# Patient Record
Sex: MALE | Race: WHITE | Employment: STUDENT | URBAN - METROPOLITAN AREA
[De-identification: names, ages, dates, MRNs, and addresses within clinical notes are randomized per-mention and may not be internally consistent; named-entity substitution may affect disease eponyms.]

---

## 2018-03-21 ENCOUNTER — OFFICE VISIT (OUTPATIENT)
Dept: FAMILY MEDICINE CLINIC | Age: 23
End: 2018-03-21
Payer: COMMERCIAL

## 2018-03-21 VITALS
SYSTOLIC BLOOD PRESSURE: 92 MMHG | TEMPERATURE: 98.3 F | HEART RATE: 88 BPM | RESPIRATION RATE: 16 BRPM | HEIGHT: 65 IN | OXYGEN SATURATION: 99 % | DIASTOLIC BLOOD PRESSURE: 68 MMHG | WEIGHT: 141.6 LBS | BODY MASS INDEX: 23.59 KG/M2

## 2018-03-21 DIAGNOSIS — L40.9 PSORIASIS: Primary | ICD-10-CM

## 2018-03-21 DIAGNOSIS — R19.00 ABDOMINAL WALL MASS OF LEFT FLANK: ICD-10-CM

## 2018-03-21 DIAGNOSIS — R01.1 CHANGE IN HEART MURMUR: ICD-10-CM

## 2018-03-21 PROCEDURE — G8427 DOCREV CUR MEDS BY ELIG CLIN: HCPCS | Performed by: FAMILY MEDICINE

## 2018-03-21 PROCEDURE — 1036F TOBACCO NON-USER: CPT | Performed by: FAMILY MEDICINE

## 2018-03-21 PROCEDURE — 99202 OFFICE O/P NEW SF 15 MIN: CPT | Performed by: FAMILY MEDICINE

## 2018-03-21 PROCEDURE — G8420 CALC BMI NORM PARAMETERS: HCPCS | Performed by: FAMILY MEDICINE

## 2018-03-21 PROCEDURE — G8484 FLU IMMUNIZE NO ADMIN: HCPCS | Performed by: FAMILY MEDICINE

## 2018-03-21 RX ORDER — FLUOCINONIDE 0.5 MG/G
OINTMENT TOPICAL
Qty: 1 TUBE | Refills: 0 | Status: SHIPPED | OUTPATIENT
Start: 2018-03-21 | End: 2018-03-28

## 2018-03-21 ASSESSMENT — ENCOUNTER SYMPTOMS
ABDOMINAL PAIN: 0
COLOR CHANGE: 1

## 2018-03-21 ASSESSMENT — PATIENT HEALTH QUESTIONNAIRE - PHQ9
1. LITTLE INTEREST OR PLEASURE IN DOING THINGS: 0
SUM OF ALL RESPONSES TO PHQ QUESTIONS 1-9: 0
2. FEELING DOWN, DEPRESSED OR HOPELESS: 0
SUM OF ALL RESPONSES TO PHQ9 QUESTIONS 1 & 2: 0

## 2018-03-21 NOTE — PROGRESS NOTES
98.3 °F (36.8 °C) (Tympanic)   Resp 16   Ht 5' 5\" (1.651 m)   Wt 141 lb 9.6 oz (64.2 kg)   SpO2 99%   BMI 23.56 kg/m²     Physical Exam   Musculoskeletal:        Arms:  Quarter sized soft tissue mass which is not fluctuant or erythematous. Not adherent to ribs    Skin:        Faint erythema with silvery scale and well bordered lesions. No warmth or swelling of knee    Neck:no carotid bruits. No masses. No adenopathy. No thyroid asymmetry. Lungs:clear and equal breath sounds. No wheezes or rales. Heart:rate reg. 1-2/6  syst murmur  along rub. No gallops     Extremities:no edema in either leg  Gen: In no acute distress      Assessment:         1. Psoriasis     2. Change in heart murmur  ECHO Complete 2D W Doppler W Color   3.  Abdominal wall mass of left flank        Plan:    observation of suspected lipoma as has been stable for over 5 years   Orders Placed This Encounter   Procedures    ECHO Complete 2D W Doppler W Color     Standing Status:   Future     Standing Expiration Date:   3/21/2019     Order Specific Question:   Reason for exam:     Answer:   9     Orders Placed This Encounter   Medications    fluocinonide (LIDEX) 0.05 % ointment     Sig: Apply topically 2 times daily x 2 weeks     Dispense:  1 Tube     Refill:  0   f/u after above done

## 2023-04-10 ENCOUNTER — PROCEDURE VISIT (OUTPATIENT)
Dept: PAIN MANAGEMENT | Age: 28
End: 2023-04-10
Payer: COMMERCIAL

## 2023-04-10 DIAGNOSIS — M79.602 BILATERAL ARM PAIN: Primary | ICD-10-CM

## 2023-04-10 DIAGNOSIS — M79.601 BILATERAL ARM PAIN: Primary | ICD-10-CM

## 2023-04-10 PROCEDURE — 99999 PR OFFICE/OUTPT VISIT,PROCEDURE ONLY: CPT | Performed by: PHYSICAL MEDICINE & REHABILITATION

## 2023-04-10 PROCEDURE — 95886 MUSC TEST DONE W/N TEST COMP: CPT | Performed by: PHYSICAL MEDICINE & REHABILITATION

## 2023-04-10 PROCEDURE — 95911 NRV CNDJ TEST 9-10 STUDIES: CPT | Performed by: PHYSICAL MEDICINE & REHABILITATION

## 2023-10-09 ENCOUNTER — OFFICE VISIT (OUTPATIENT)
Dept: FAMILY MEDICINE CLINIC | Age: 28
End: 2023-10-09
Payer: COMMERCIAL

## 2023-10-09 VITALS
SYSTOLIC BLOOD PRESSURE: 110 MMHG | HEIGHT: 65 IN | WEIGHT: 162.4 LBS | TEMPERATURE: 98.4 F | DIASTOLIC BLOOD PRESSURE: 70 MMHG | BODY MASS INDEX: 27.06 KG/M2 | OXYGEN SATURATION: 98 % | HEART RATE: 70 BPM

## 2023-10-09 DIAGNOSIS — H66.92 LEFT ACUTE OTITIS MEDIA: Primary | ICD-10-CM

## 2023-10-09 DIAGNOSIS — J06.9 URI WITH COUGH AND CONGESTION: ICD-10-CM

## 2023-10-09 PROCEDURE — 99213 OFFICE O/P EST LOW 20 MIN: CPT | Performed by: NURSE PRACTITIONER

## 2023-10-09 PROCEDURE — 1036F TOBACCO NON-USER: CPT | Performed by: NURSE PRACTITIONER

## 2023-10-09 PROCEDURE — G8484 FLU IMMUNIZE NO ADMIN: HCPCS | Performed by: NURSE PRACTITIONER

## 2023-10-09 PROCEDURE — G8419 CALC BMI OUT NRM PARAM NOF/U: HCPCS | Performed by: NURSE PRACTITIONER

## 2023-10-09 PROCEDURE — G8427 DOCREV CUR MEDS BY ELIG CLIN: HCPCS | Performed by: NURSE PRACTITIONER

## 2023-10-09 RX ORDER — BENZONATATE 100 MG/1
100 CAPSULE ORAL 3 TIMES DAILY PRN
Qty: 30 CAPSULE | Refills: 0 | Status: SHIPPED | OUTPATIENT
Start: 2023-10-09 | End: 2023-10-19

## 2023-10-09 RX ORDER — AMOXICILLIN 875 MG/1
875 TABLET, COATED ORAL 2 TIMES DAILY
Qty: 14 TABLET | Refills: 0 | Status: SHIPPED | OUTPATIENT
Start: 2023-10-09 | End: 2023-10-16

## 2023-10-09 SDOH — ECONOMIC STABILITY: FOOD INSECURITY: WITHIN THE PAST 12 MONTHS, THE FOOD YOU BOUGHT JUST DIDN'T LAST AND YOU DIDN'T HAVE MONEY TO GET MORE.: NEVER TRUE

## 2023-10-09 SDOH — ECONOMIC STABILITY: INCOME INSECURITY: HOW HARD IS IT FOR YOU TO PAY FOR THE VERY BASICS LIKE FOOD, HOUSING, MEDICAL CARE, AND HEATING?: NOT HARD AT ALL

## 2023-10-09 SDOH — ECONOMIC STABILITY: HOUSING INSECURITY
IN THE LAST 12 MONTHS, WAS THERE A TIME WHEN YOU DID NOT HAVE A STEADY PLACE TO SLEEP OR SLEPT IN A SHELTER (INCLUDING NOW)?: NO

## 2023-10-09 SDOH — ECONOMIC STABILITY: FOOD INSECURITY: WITHIN THE PAST 12 MONTHS, YOU WORRIED THAT YOUR FOOD WOULD RUN OUT BEFORE YOU GOT MONEY TO BUY MORE.: NEVER TRUE

## 2023-10-09 ASSESSMENT — ENCOUNTER SYMPTOMS
NAUSEA: 0
EYE PAIN: 0
EYE ITCHING: 0
EYE REDNESS: 0
SHORTNESS OF BREATH: 0
DIARRHEA: 0
EYE DISCHARGE: 0
WHEEZING: 0
ABDOMINAL PAIN: 0
SORE THROAT: 1
COUGH: 1
VOMITING: 0
SINUS PAIN: 1

## 2023-10-09 ASSESSMENT — PATIENT HEALTH QUESTIONNAIRE - PHQ9
SUM OF ALL RESPONSES TO PHQ QUESTIONS 1-9: 0
2. FEELING DOWN, DEPRESSED OR HOPELESS: 0
SUM OF ALL RESPONSES TO PHQ9 QUESTIONS 1 & 2: 0
SUM OF ALL RESPONSES TO PHQ QUESTIONS 1-9: 0
1. LITTLE INTEREST OR PLEASURE IN DOING THINGS: 0

## 2023-10-09 NOTE — PATIENT INSTRUCTIONS
Antibiotic Instructions: Complete the full course of antibiotics as ordered. Take each dose with a small snack or meal to lessen potential GI upset. To prevent antibiotic resistance, please take medication as ordered and for the full duration even if you start to feel better. Consider intake of yogurt or probiotic during antibiotic use and for a few days after to help reduce the risk of developing a secondary infection. Take the yogurt or probiotic at least 2 hours after taking the antibiotic.      Symptoms worsen or do not improve return or see PCP

## 2023-10-09 NOTE — PROGRESS NOTES
Subjective  [place] Maritza, 33 y.o. male presents today with:  Chief Complaint   Patient presents with    Pharyngitis    Cough       HPI  Patient here with wife, who has similar symptoms but not has severe   Symptoms started last week as well not getting any better   Started with sore throat, headache and coughing   Symptoms have not improved   Using mucinex with some relief of symptoms     Review of Systems   Constitutional:  Negative for appetite change, chills, fatigue and fever. HENT:  Positive for congestion, postnasal drip, sinus pain and sore throat. Negative for ear pain. Eyes:  Negative for pain, discharge, redness and itching. Respiratory:  Positive for cough (greem). Negative for shortness of breath and wheezing. Gastrointestinal:  Negative for abdominal pain, diarrhea, nausea and vomiting. Musculoskeletal:  Negative for myalgias. Neurological:  Positive for headaches. Past Medical History:   Diagnosis Date    Allergic rhinitis     RAD (reactive airway disease)      History reviewed. No pertinent surgical history.   Social History     Socioeconomic History    Marital status: Single     Spouse name: Not on file    Number of children: Not on file    Years of education: Not on file    Highest education level: Not on file   Occupational History    Not on file   Tobacco Use    Smoking status: Never    Smokeless tobacco: Never   Substance and Sexual Activity    Alcohol use: Not on file    Drug use: Not on file    Sexual activity: Not on file   Other Topics Concern    Not on file   Social History Narrative    Not on file     Social Determinants of Health     Financial Resource Strain: Low Risk  (10/9/2023)    Overall Financial Resource Strain (CARDIA)     Difficulty of Paying Living Expenses: Not hard at all   Food Insecurity: No Food Insecurity (10/9/2023)    Hunger Vital Sign     Worried About Running Out of Food in the Last Year: Never true     [address] in the Last Year: Never

## 2023-10-27 ENCOUNTER — OFFICE VISIT (OUTPATIENT)
Dept: FAMILY MEDICINE CLINIC | Age: 28
End: 2023-10-27
Payer: COMMERCIAL

## 2023-10-27 VITALS
SYSTOLIC BLOOD PRESSURE: 122 MMHG | WEIGHT: 162 LBS | BODY MASS INDEX: 26.96 KG/M2 | OXYGEN SATURATION: 98 % | TEMPERATURE: 101.7 F | HEART RATE: 110 BPM | DIASTOLIC BLOOD PRESSURE: 70 MMHG

## 2023-10-27 DIAGNOSIS — Z20.822 SUSPECTED COVID-19 VIRUS INFECTION: Primary | ICD-10-CM

## 2023-10-27 DIAGNOSIS — R05.1 ACUTE COUGH: ICD-10-CM

## 2023-10-27 DIAGNOSIS — Z20.822 EXPOSURE TO CONFIRMED CASE OF COVID-19: ICD-10-CM

## 2023-10-27 DIAGNOSIS — J02.9 SORE THROAT: ICD-10-CM

## 2023-10-27 DIAGNOSIS — R50.9 CHILLS WITH FEVER: ICD-10-CM

## 2023-10-27 LAB
Lab: NORMAL
PERFORMING INSTRUMENT: NORMAL
QC PASS/FAIL: NORMAL
SARS-COV-2, POC: NORMAL

## 2023-10-27 PROCEDURE — 1036F TOBACCO NON-USER: CPT | Performed by: NURSE PRACTITIONER

## 2023-10-27 PROCEDURE — 87426 SARSCOV CORONAVIRUS AG IA: CPT | Performed by: NURSE PRACTITIONER

## 2023-10-27 PROCEDURE — G8484 FLU IMMUNIZE NO ADMIN: HCPCS | Performed by: NURSE PRACTITIONER

## 2023-10-27 PROCEDURE — G8419 CALC BMI OUT NRM PARAM NOF/U: HCPCS | Performed by: NURSE PRACTITIONER

## 2023-10-27 PROCEDURE — G8427 DOCREV CUR MEDS BY ELIG CLIN: HCPCS | Performed by: NURSE PRACTITIONER

## 2023-10-27 PROCEDURE — 99213 OFFICE O/P EST LOW 20 MIN: CPT | Performed by: NURSE PRACTITIONER

## 2023-10-27 ASSESSMENT — ENCOUNTER SYMPTOMS
COUGH: 1
DIARRHEA: 0
SORE THROAT: 1
ABDOMINAL PAIN: 0
RHINORRHEA: 1
NAUSEA: 0

## 2023-10-27 NOTE — PROGRESS NOTES
flag s/s, seek care at the ER  Trouble breathing  Persistent chest pain  Confusion  Extreme fatigue  Dehydration         Reviewed with the patient: current clinical status. Discussed with patient COVID-19 s/s. Pt aware to remain on home isolation based on today's test result. Pt instructed on red flag s/s to go to the ER for or to call 911. Pt verbalized understanding. Treatment includes supportive care with rest, hydration, and medications for symptom management as discussed. This is a contagious illness which is transmitted through the air. Make sure to cover your cough and practice good hand hygiene. When to call for help  Call 911 anytime you think you may need emergency care. For example, call if:  You have severe trouble breathing. You have severe dehydration. Close follow up to evaluate treatment results and for coordination of care. I have reviewed the patient's medical history in detail and updated the computerized patient record.       Kristi Winkler, APRN - NP

## 2023-10-28 ASSESSMENT — VISUAL ACUITY: OU: 1

## 2023-10-28 ASSESSMENT — ENCOUNTER SYMPTOMS
SHORTNESS OF BREATH: 0
CHEST TIGHTNESS: 1

## 2024-02-05 ENCOUNTER — TELEPHONE (OUTPATIENT)
Dept: PRIMARY CARE | Facility: CLINIC | Age: 29
End: 2024-02-05
Payer: COMMERCIAL

## 2024-02-06 ENCOUNTER — TELEPHONE (OUTPATIENT)
Dept: ORTHOPEDIC SURGERY | Facility: CLINIC | Age: 29
End: 2024-02-06
Payer: COMMERCIAL

## 2024-02-07 DIAGNOSIS — G54.0 THORACIC OUTLET SYNDROME: ICD-10-CM

## 2024-03-27 ENCOUNTER — TELEPHONE (OUTPATIENT)
Dept: VASCULAR SURGERY | Facility: CLINIC | Age: 29
End: 2024-03-27
Payer: COMMERCIAL

## 2024-03-27 NOTE — TELEPHONE ENCOUNTER
Pt was referred to Dr. Felipe for Thoracic Outlet Syndrome (TOS).  Spoke with Dr. Felipe and he stated that he doesn't see pt's for this and that he recommends that the pt be seen by either , Dr. Layton or Dr. Her.    Called and left pt a detailed message letting him know that his appt for 4/3/24 with Dr. Felipe is cancelled and that he needs to be seen by one of the Dr.'s listen above. Advised pt in the message that if he has any questions to give our office a call at [phone]. Also stated he can call [phone] to schedule with any of the above listed Dr.'s and that they do each have a few different locations and if he needed more information on that he can call our office.       Natalie Jensen MA

## 2024-04-03 ENCOUNTER — APPOINTMENT (OUTPATIENT)
Dept: VASCULAR SURGERY | Facility: CLINIC | Age: 29
End: 2024-04-03
Payer: COMMERCIAL

## 2024-04-23 ENCOUNTER — OFFICE VISIT (OUTPATIENT)
Dept: VASCULAR SURGERY | Facility: CLINIC | Age: 29
End: 2024-04-23
Payer: COMMERCIAL

## 2024-04-23 VITALS
DIASTOLIC BLOOD PRESSURE: 85 MMHG | SYSTOLIC BLOOD PRESSURE: 148 MMHG | HEIGHT: 65 IN | BODY MASS INDEX: 28.49 KG/M2 | WEIGHT: 171 LBS | RESPIRATION RATE: 18 BRPM | HEART RATE: 75 BPM

## 2024-04-23 DIAGNOSIS — G54.0 THORACIC OUTLET SYNDROME: ICD-10-CM

## 2024-04-23 PROCEDURE — 1036F TOBACCO NON-USER: CPT | Performed by: SURGERY

## 2024-04-23 PROCEDURE — 99215 OFFICE O/P EST HI 40 MIN: CPT | Performed by: SURGERY

## 2024-04-23 PROCEDURE — 99205 OFFICE O/P NEW HI 60 MIN: CPT | Performed by: SURGERY

## 2024-04-23 ASSESSMENT — LIFESTYLE VARIABLES
HOW MANY STANDARD DRINKS CONTAINING ALCOHOL DO YOU HAVE ON A TYPICAL DAY: PATIENT DOES NOT DRINK
HOW OFTEN DO YOU HAVE A DRINK CONTAINING ALCOHOL: NEVER
HOW OFTEN DO YOU HAVE SIX OR MORE DRINKS ON ONE OCCASION: NEVER
SKIP TO QUESTIONS 9-10: 1
AUDIT-C TOTAL SCORE: 0

## 2024-04-23 ASSESSMENT — PATIENT HEALTH QUESTIONNAIRE - PHQ9
SUM OF ALL RESPONSES TO PHQ9 QUESTIONS 1 AND 2: 0
2. FEELING DOWN, DEPRESSED OR HOPELESS: NOT AT ALL
1. LITTLE INTEREST OR PLEASURE IN DOING THINGS: NOT AT ALL

## 2024-04-23 ASSESSMENT — ENCOUNTER SYMPTOMS
DEPRESSION: 0
LOSS OF SENSATION IN FEET: 0
OCCASIONAL FEELINGS OF UNSTEADINESS: 0

## 2024-04-23 ASSESSMENT — PAIN SCALES - GENERAL: PAINLEVEL: 5

## 2024-04-23 NOTE — PROGRESS NOTES
Vascular Surgery Consultation, History, Physical    Papa Vijay is a 28 y.o. year old male patient.   History: Comes for evaluation of thoracic outlet syndrome.  About 15 years ago he injured his right shoulder and began having symptoms of right arm forearm and hand numbness and weakness with use.  He has seen orthopedic surgeons and was diagnosed with thoracic outlet syndrome in the past and has undergone physical therapy.  The physical therapy has increased range of motion but not the symptoms.  He cannot use his right arm basically above his head for any length of time without it becoming numb and weak.  He denies any pain.  He reports he has had an EMG which showed normal nerve function.  He had an MRI of the shoulder showing a labral tear that is chronic but his physician will not treat this until the issue of his thoracic outlet is addressed.  He denies any neck pain.  He works as a  and at [place]  Thoracic Outlet Exam and Testing  Supraclavicular:   Right Negative  Left Negative    Infraclavicular:  Right Negative  Left Negative    Shoulder:  Right Positive, describe: crepitus with ROM  Left Negative    Arm:  Right Negative  Left Negative    Forearm:   Right Negative  Left Negative    Hand:   Right Negative  Left Negative    Adsons Maneuver  Right Positive, describe: loss of pulses in stress positions  Left Negative    Ara Test (Elevated Arm Stress Test)  Right Positive, describe: pale and numb hand after about 30 s  Left Positive, describe: numb after a minute    EMG Results: historic -normal per patient  Venous Duplex: none  CT: ordered  MR: none   History reviewed. No pertinent past medical history.    History reviewed. No pertinent surgical history.    Active Ambulatory Problems     Diagnosis Date Noted    No Active Ambulatory Problems     Resolved Ambulatory Problems     Diagnosis Date Noted    No Resolved Ambulatory Problems     No Additional Past  "Medical History       Review of Systems   Constitutional: Negative.    HENT: Negative.     Eyes: Negative.    Respiratory: Negative.     Cardiovascular: Negative.    Gastrointestinal: Negative.    Endocrine: Negative.    Genitourinary: Negative.    Musculoskeletal: Negative.    Skin: Negative.    Allergic/Immunologic: Negative.    Neurological: Negative.    Hematological: Negative.    Psychiatric/Behavioral: Negative.       No Known Allergies    Vitals:   Visit Vitals  /85   Pulse 75   Resp 18     Physical Exam:   Vascular Physical Exam  Constitutional:       Appearance: Normal appearance.   HENT:      Head: Normocephalic.   Eyes:      Pupils: Pupils are equal, round, and reactive to light.   Cardiovascular:      Rate and Rhythm: Normal rate.      Pulses:           Radial pulses are 2+ on the right side and 2+ on the left side.   Pulmonary:      Effort: Pulmonary effort is normal.   Abdominal:      General: Abdomen is flat.   Musculoskeletal:         General: Signs of injury present. Normal range of motion.      Neck: Full passive range of motion without pain.   Skin:     General: Skin is warm.   Neurological:      General: No focal deficit present.      Mental Status: He is alert and oriented to person, place, and time. Mental status is at baseline.      Cranial Nerves: No cranial nerve deficit.      Sensory: No sensory deficit.      Motor: No weakness.   Psychiatric:         Mood and Affect: Mood normal.         Behavior: Behavior normal.         Labs:  LABS:  CBC with Differential:  No results found for: \"WBC\", \"RBC\", \"HGB\", \"HCT\", \"PLT\", \"MCV\", \"MCH\", \"MCHC\", \"RDW\", \"NRBC\", \"SEGSPCT\", \"BANDSPCT\", \"BLASTSPCT\", \"METASPCT\", \"LYMPHOPCT\", \"PROMYELOPCT\", \"MONOPCT\", \"MYELOPCT\", \"EOSPCT\", \"BASOPCT\", \"MONOSABS\", \"LYMPHSABS\", \"EOSABS\", \"BASOSABS\", \"DIFFTYPE\"  CMP:  No results found for: \"NA\", \"K\", \"CL\", \"CO2\", \"BUN\", \"CREATININE\", \"AGRATIO\", \"GLUCOSE\", \"GLU\", \"PROT\", \"CALCIUM\", \"BILITOT\", \"ALKPHOS\", \"AST\", " "\"ALT\"  BMP:  No results found for: \"NA\", \"K\", \"CL\", \"CO2\", \"BUN\", \"CREATININE\", \"CALCIUM\", \"GLUCOSE\", \"GLU\"  Magnesium:No results found for: \"MG\"  Troponin:  No results found for: \"TROPHS\"  BNP: No results found for: \"BNP\"    No results found for: \"PR1\", \"BMPR1A\", \"INR\", \"PROTIME\", \"PTT\", \"CHOL\", \"LDLF\", \"HDL\"    Imaging: ordered      Assessment/Plan   Diagnoses and all orders for this visit:  Thoracic outlet syndrome  -     Referral to Vascular Surgery  -     CT angio upper extremity right w and or wo IV contrast; Future  -     Vascular US lower extremity arterial duplex bilateral; Future      "

## 2024-05-07 ENCOUNTER — HOSPITAL ENCOUNTER (OUTPATIENT)
Dept: RADIOLOGY | Facility: HOSPITAL | Age: 29
Discharge: HOME | End: 2024-05-07
Payer: COMMERCIAL

## 2024-05-07 DIAGNOSIS — G54.0 THORACIC OUTLET SYNDROME: ICD-10-CM

## 2024-05-07 PROCEDURE — 73206 CT ANGIO UPR EXTRM W/O&W/DYE: CPT | Mod: RT

## 2024-05-07 PROCEDURE — 2550000001 HC RX 255 CONTRASTS

## 2024-05-07 PROCEDURE — 73206 CT ANGIO UPR EXTRM W/O&W/DYE: CPT | Mod: RIGHT SIDE | Performed by: RADIOLOGY

## 2024-05-07 RX ADMIN — IOHEXOL 90 ML: 350 INJECTION, SOLUTION INTRAVENOUS at 10:39

## 2024-05-28 ENCOUNTER — OFFICE VISIT (OUTPATIENT)
Dept: VASCULAR SURGERY | Facility: CLINIC | Age: 29
End: 2024-05-28
Payer: COMMERCIAL

## 2024-05-28 ENCOUNTER — ANCILLARY PROCEDURE (OUTPATIENT)
Dept: VASCULAR MEDICINE | Facility: CLINIC | Age: 29
End: 2024-05-28
Payer: COMMERCIAL

## 2024-05-28 VITALS
DIASTOLIC BLOOD PRESSURE: 89 MMHG | BODY MASS INDEX: 27.57 KG/M2 | SYSTOLIC BLOOD PRESSURE: 134 MMHG | HEIGHT: 65 IN | WEIGHT: 165.5 LBS | HEART RATE: 71 BPM | RESPIRATION RATE: 18 BRPM

## 2024-05-28 DIAGNOSIS — G54.0 THORACIC OUTLET SYNDROME: ICD-10-CM

## 2024-05-28 DIAGNOSIS — G54.0 THORACIC OUTLET SYNDROME: Primary | ICD-10-CM

## 2024-05-28 PROCEDURE — 1036F TOBACCO NON-USER: CPT | Performed by: SURGERY

## 2024-05-28 PROCEDURE — 93930 UPPER EXTREMITY STUDY: CPT | Performed by: INTERNAL MEDICINE

## 2024-05-28 PROCEDURE — 93930 UPPER EXTREMITY STUDY: CPT

## 2024-05-28 PROCEDURE — 93923 UPR/LXTR ART STDY 3+ LVLS: CPT | Performed by: INTERNAL MEDICINE

## 2024-05-28 PROCEDURE — 99215 OFFICE O/P EST HI 40 MIN: CPT | Performed by: SURGERY

## 2024-05-28 PROCEDURE — 93923 UPR/LXTR ART STDY 3+ LVLS: CPT

## 2024-05-28 ASSESSMENT — ENCOUNTER SYMPTOMS
LOSS OF SENSATION IN FEET: 0
DEPRESSION: 0
OCCASIONAL FEELINGS OF UNSTEADINESS: 0

## 2024-05-28 ASSESSMENT — LIFESTYLE VARIABLES
HOW MANY STANDARD DRINKS CONTAINING ALCOHOL DO YOU HAVE ON A TYPICAL DAY: PATIENT DOES NOT DRINK
SKIP TO QUESTIONS 9-10: 1
HOW OFTEN DO YOU HAVE A DRINK CONTAINING ALCOHOL: NEVER
AUDIT-C TOTAL SCORE: 0
HOW OFTEN DO YOU HAVE SIX OR MORE DRINKS ON ONE OCCASION: NEVER

## 2024-05-28 ASSESSMENT — PAIN SCALES - GENERAL: PAINLEVEL: 4

## 2024-05-28 NOTE — PROGRESS NOTES
Primary Care Physician: No primary care provider on file.  Primary Cardiologist:       Date of Visit: 05/28/2024  3:00 PM EDT  Location of visit: [facility] [place]   Type of Visit: Follow up             Chief Complaint   Patient presents with    Follow-up     Test results, TOS       HPI / Summary:   Papa Linares is a 28 y.o. male  with    who returns for routine follow up   The patient comes back after obtaining imaging.  The CT scan shows no extra ribs and with the arm raised passively no significant compression is visualized.  On noninvasive vascular imaging compression is seen on several stress positions in particular the 90 degree extension of the arm completely ablates flow in the subclavian artery.  The patient has had symptoms for several years and is currently worsening.  Physical therapy has been offered to him.  In the past and even recently physical therapy has only increased his range of motion without improving his symptoms.  He does have a torn labrum of the right shoulder but is deferring surgery until he gets the thoracic outlet repaired.    12 system review is negative except as noted above        Medical History:   No past medical history on file.    Social History:   Tobacco Use: Low Risk  (5/28/2024)    Patient History     Smoking Tobacco Use: Never     Smokeless Tobacco Use: Never     Passive Exposure: Not on file         MEDICATIONS:   No current outpatient medications      IMAGING REVIEWED:   Echocardiogram: No results found for this or any previous visit from the past 1825 days.    Stress Testing: No results found for this or any previous visit from the past 1825 days.    Cardiac Catheterization: No results found for this or any previous visit from the past 1825 days.    Cardiac Scoring: No results found for this or any previous visit from the past 1825 days.    AAA : No results found for this or any previous visit from the past 1825 days.    OTHER: No results found for this or any  "previous visit from the past 1825 days.    Physical  Thoracic Outlet Exam and Testing  Supraclavicular:   Right Negative  Left Negative     Infraclavicular:  Right Negative  Left Negative     Shoulder:  Right Positive, describe: crepitus with ROM  Left Negative     Arm:  Right Negative  Left Negative     Forearm:   Right Negative  Left Negative     Hand:   Right Negative  Left Negative     Adsons Maneuver  Right Positive, describe: loss of pulses in stress positions  Left Negative     Ara Test (Elevated Arm Stress Test)  Right Positive, describe: pale and numb hand after about 30 s  Left Positive, describe: numb after a minute     EMG Results: historic -normal per patient  Venous Duplex: none  CT: reviewed  MR: reviewed       Constitutional:       Appearance: Normal appearance.   HENT:      Head: Normocephalic.   Eyes:      Pupils: Pupils are equal, round, and reactive to light.   Cardiovascular:      Rate and Rhythm: Normal rate.      Pulses:           Radial pulses are 2+ on the right side and 2+ on the left side.   Pulmonary:      Effort: Pulmonary effort is normal.   Abdominal:      General: Abdomen is flat.   Musculoskeletal:         General: Signs of injury present. Normal range of motion.      Neck: Full passive range of motion without pain.   Skin:     General: Skin is warm.   Neurological:      General: No focal deficit present.      Mental Status: He is alert and oriented to person, place, and time. Mental status is at baseline.      Cranial Nerves: No cranial nerve deficit.      Sensory: No sensory deficit.      Motor: No weakness.   Psychiatric:         Mood and Affect: Mood normal.         Behavior: Behavior normal.     LABS:  CBC with Differential:  No results found for: \"WBC\", \"RBC\", \"HGB\", \"HCT\", \"PLT\", \"MCV\", \"MCH\", \"MCHC\", \"RDW\", \"NRBC\", \"SEGSPCT\", \"BANDSPCT\", \"BLASTSPCT\", \"METASPCT\", \"LYMPHOPCT\", \"PROMYELOPCT\", \"MONOPCT\", \"MYELOPCT\", \"EOSPCT\", \"BASOPCT\", \"MONOSABS\", \"LYMPHSABS\", \"EOSABS\", " "\"BASOSABS\", \"DIFFTYPE\"  CMP:  No results found for: \"NA\", \"K\", \"CL\", \"CO2\", \"BUN\", \"CREATININE\", \"AGRATIO\", \"GLUCOSE\", \"GLU\", \"PROT\", \"CALCIUM\", \"BILITOT\", \"ALKPHOS\", \"AST\", \"ALT\"  BMP:  No results found for: \"NA\", \"K\", \"CL\", \"CO2\", \"BUN\", \"CREATININE\", \"CALCIUM\", \"GLUCOSE\", \"GLU\"  Magnesium:No results found for: \"MG\"  Troponin:  No results found for: \"TROPHS\"  BNP: No results found for: \"BNP\"      Lipid Panel:  No results found for: \"HDL\", \"CHHDL\", \"VLDL\", \"TRIG\", \"NHDL\"     Lab work and imaging results independently reviewed by me         Diagnoses and all orders for this visit:  Thoracic outlet syndrome  -     Case Request Operating Room: Excision Bone Rib  -     Basic Metabolic Panel; Future  -     CBC; Future  -     Type And Screen; Future  -     Request for Pre-Admission Testing Visit; Future    The risk and rationale for right first rib resection was discussed.  The patient does have compression of the left arm vessels as well and some mild to moderate symptoms but this will be deferred pending results of the right side.  The chances of complete success are in the 85% range in line with other operations done for pain.  The specific risks of nerve injury was also discussed.  Other general risks also mention.  The patient understands and agrees to proceed.        Sofia Layton MD       Orders:  Orders Placed This Encounter   Procedures    Request for Pre-Admission Testing Visit    Basic Metabolic Panel    CBC    Type And Screen         Followup Appts:  No future appointments.            "

## 2024-05-28 NOTE — H&P (VIEW-ONLY)
Primary Care Physician: No primary care provider on file.  Primary Cardiologist:       Date of Visit: 05/28/2024  3:00 PM EDT  Location of visit: [facility] [place]   Type of Visit: Follow up             Chief Complaint   Patient presents with    Follow-up     Test results, TOS       HPI / Summary:   Papa Linares is a 28 y.o. male  with    who returns for routine follow up   The patient comes back after obtaining imaging.  The CT scan shows no extra ribs and with the arm raised passively no significant compression is visualized.  On noninvasive vascular imaging compression is seen on several stress positions in particular the 90 degree extension of the arm completely ablates flow in the subclavian artery.  The patient has had symptoms for several years and is currently worsening.  Physical therapy has been offered to him.  In the past and even recently physical therapy has only increased his range of motion without improving his symptoms.  He does have a torn labrum of the right shoulder but is deferring surgery until he gets the thoracic outlet repaired.    12 system review is negative except as noted above        Medical History:   No past medical history on file.    Social History:   Tobacco Use: Low Risk  (5/28/2024)    Patient History     Smoking Tobacco Use: Never     Smokeless Tobacco Use: Never     Passive Exposure: Not on file         MEDICATIONS:   No current outpatient medications      IMAGING REVIEWED:   Echocardiogram: No results found for this or any previous visit from the past 1825 days.    Stress Testing: No results found for this or any previous visit from the past 1825 days.    Cardiac Catheterization: No results found for this or any previous visit from the past 1825 days.    Cardiac Scoring: No results found for this or any previous visit from the past 1825 days.    AAA : No results found for this or any previous visit from the past 1825 days.    OTHER: No results found for this or any  "previous visit from the past 1825 days.    Physical  Thoracic Outlet Exam and Testing  Supraclavicular:   Right Negative  Left Negative     Infraclavicular:  Right Negative  Left Negative     Shoulder:  Right Positive, describe: crepitus with ROM  Left Negative     Arm:  Right Negative  Left Negative     Forearm:   Right Negative  Left Negative     Hand:   Right Negative  Left Negative     Adsons Maneuver  Right Positive, describe: loss of pulses in stress positions  Left Negative     Ara Test (Elevated Arm Stress Test)  Right Positive, describe: pale and numb hand after about 30 s  Left Positive, describe: numb after a minute     EMG Results: historic -normal per patient  Venous Duplex: none  CT: reviewed  MR: reviewed       Constitutional:       Appearance: Normal appearance.   HENT:      Head: Normocephalic.   Eyes:      Pupils: Pupils are equal, round, and reactive to light.   Cardiovascular:      Rate and Rhythm: Normal rate.      Pulses:           Radial pulses are 2+ on the right side and 2+ on the left side.   Pulmonary:      Effort: Pulmonary effort is normal.   Abdominal:      General: Abdomen is flat.   Musculoskeletal:         General: Signs of injury present. Normal range of motion.      Neck: Full passive range of motion without pain.   Skin:     General: Skin is warm.   Neurological:      General: No focal deficit present.      Mental Status: He is alert and oriented to person, place, and time. Mental status is at baseline.      Cranial Nerves: No cranial nerve deficit.      Sensory: No sensory deficit.      Motor: No weakness.   Psychiatric:         Mood and Affect: Mood normal.         Behavior: Behavior normal.     LABS:  CBC with Differential:  No results found for: \"WBC\", \"RBC\", \"HGB\", \"HCT\", \"PLT\", \"MCV\", \"MCH\", \"MCHC\", \"RDW\", \"NRBC\", \"SEGSPCT\", \"BANDSPCT\", \"BLASTSPCT\", \"METASPCT\", \"LYMPHOPCT\", \"PROMYELOPCT\", \"MONOPCT\", \"MYELOPCT\", \"EOSPCT\", \"BASOPCT\", \"MONOSABS\", \"LYMPHSABS\", \"EOSABS\", " "\"BASOSABS\", \"DIFFTYPE\"  CMP:  No results found for: \"NA\", \"K\", \"CL\", \"CO2\", \"BUN\", \"CREATININE\", \"AGRATIO\", \"GLUCOSE\", \"GLU\", \"PROT\", \"CALCIUM\", \"BILITOT\", \"ALKPHOS\", \"AST\", \"ALT\"  BMP:  No results found for: \"NA\", \"K\", \"CL\", \"CO2\", \"BUN\", \"CREATININE\", \"CALCIUM\", \"GLUCOSE\", \"GLU\"  Magnesium:No results found for: \"MG\"  Troponin:  No results found for: \"TROPHS\"  BNP: No results found for: \"BNP\"      Lipid Panel:  No results found for: \"HDL\", \"CHHDL\", \"VLDL\", \"TRIG\", \"NHDL\"     Lab work and imaging results independently reviewed by me         Diagnoses and all orders for this visit:  Thoracic outlet syndrome  -     Case Request Operating Room: Excision Bone Rib  -     Basic Metabolic Panel; Future  -     CBC; Future  -     Type And Screen; Future  -     Request for Pre-Admission Testing Visit; Future    The risk and rationale for right first rib resection was discussed.  The patient does have compression of the left arm vessels as well and some mild to moderate symptoms but this will be deferred pending results of the right side.  The chances of complete success are in the 85% range in line with other operations done for pain.  The specific risks of nerve injury was also discussed.  Other general risks also mention.  The patient understands and agrees to proceed.        Sofia Layton MD       Orders:  Orders Placed This Encounter   Procedures    Request for Pre-Admission Testing Visit    Basic Metabolic Panel    CBC    Type And Screen         Followup Appts:  No future appointments.            "

## 2024-05-30 PROBLEM — G54.0 THORACIC OUTLET SYNDROME: Status: ACTIVE | Noted: 2024-05-28

## 2024-06-04 ENCOUNTER — LAB (OUTPATIENT)
Dept: LAB | Facility: LAB | Age: 29
End: 2024-06-04
Payer: COMMERCIAL

## 2024-06-04 DIAGNOSIS — G54.0 THORACIC OUTLET SYNDROME: ICD-10-CM

## 2024-06-04 LAB
ANION GAP SERPL CALC-SCNC: 11 MMOL/L (ref 10–20)
BUN SERPL-MCNC: 22 MG/DL (ref 6–23)
CALCIUM SERPL-MCNC: 9.4 MG/DL (ref 8.6–10.3)
CHLORIDE SERPL-SCNC: 104 MMOL/L (ref 98–107)
CO2 SERPL-SCNC: 29 MMOL/L (ref 21–32)
CREAT SERPL-MCNC: 1.32 MG/DL (ref 0.5–1.3)
EGFRCR SERPLBLD CKD-EPI 2021: 75 ML/MIN/1.73M*2
ERYTHROCYTE [DISTWIDTH] IN BLOOD BY AUTOMATED COUNT: 12.4 % (ref 11.5–14.5)
GLUCOSE SERPL-MCNC: 87 MG/DL (ref 74–99)
HCT VFR BLD AUTO: 42.8 % (ref 41–52)
HGB BLD-MCNC: 14.5 G/DL (ref 13.5–17.5)
MCH RBC QN AUTO: 30 PG (ref 26–34)
MCHC RBC AUTO-ENTMCNC: 33.9 G/DL (ref 32–36)
MCV RBC AUTO: 88 FL (ref 80–100)
NRBC BLD-RTO: 0 /100 WBCS (ref 0–0)
PLATELET # BLD AUTO: 263 X10*3/UL (ref 150–450)
POTASSIUM SERPL-SCNC: 4.2 MMOL/L (ref 3.5–5.3)
RBC # BLD AUTO: 4.84 X10*6/UL (ref 4.5–5.9)
SODIUM SERPL-SCNC: 140 MMOL/L (ref 136–145)
WBC # BLD AUTO: 4.5 X10*3/UL (ref 4.4–11.3)

## 2024-06-04 PROCEDURE — 36415 COLL VENOUS BLD VENIPUNCTURE: CPT

## 2024-06-04 PROCEDURE — 86850 RBC ANTIBODY SCREEN: CPT

## 2024-06-04 PROCEDURE — 80048 BASIC METABOLIC PNL TOTAL CA: CPT

## 2024-06-04 PROCEDURE — 85027 COMPLETE CBC AUTOMATED: CPT

## 2024-06-04 PROCEDURE — 86900 BLOOD TYPING SEROLOGIC ABO: CPT

## 2024-06-04 PROCEDURE — 86901 BLOOD TYPING SEROLOGIC RH(D): CPT

## 2024-06-05 ENCOUNTER — LAB REQUISITION (OUTPATIENT)
Dept: LAB | Facility: HOSPITAL | Age: 29
End: 2024-06-05
Payer: COMMERCIAL

## 2024-06-05 LAB
ABO GROUP (TYPE) IN BLOOD: NORMAL
ANTIBODY SCREEN: NORMAL
RH FACTOR (ANTIGEN D): NORMAL

## 2024-06-07 ENCOUNTER — APPOINTMENT (OUTPATIENT)
Dept: VASCULAR SURGERY | Facility: HOSPITAL | Age: 29
End: 2024-06-07
Payer: COMMERCIAL

## 2024-06-11 ENCOUNTER — LAB (OUTPATIENT)
Dept: LAB | Facility: LAB | Age: 29
End: 2024-06-11
Payer: COMMERCIAL

## 2024-06-11 ENCOUNTER — PRE-ADMISSION TESTING (OUTPATIENT)
Dept: PREADMISSION TESTING | Facility: HOSPITAL | Age: 29
End: 2024-06-11
Payer: COMMERCIAL

## 2024-06-11 VITALS
SYSTOLIC BLOOD PRESSURE: 143 MMHG | WEIGHT: 169.75 LBS | TEMPERATURE: 97.2 F | HEART RATE: 57 BPM | BODY MASS INDEX: 28.28 KG/M2 | RESPIRATION RATE: 16 BRPM | OXYGEN SATURATION: 100 % | HEIGHT: 65 IN | DIASTOLIC BLOOD PRESSURE: 62 MMHG

## 2024-06-11 DIAGNOSIS — Z01.818 PREOPERATIVE TESTING: Primary | ICD-10-CM

## 2024-06-11 DIAGNOSIS — G54.0 THORACIC OUTLET SYNDROME: ICD-10-CM

## 2024-06-11 DIAGNOSIS — Z01.818 PREOPERATIVE TESTING: ICD-10-CM

## 2024-06-11 LAB
APTT PPP: 25.6 SECONDS (ref 22–32.5)
INR PPP: 1 (ref 0.9–1.2)
PROTHROMBIN TIME: 11 SECONDS (ref 9.3–12.7)

## 2024-06-11 PROCEDURE — 87081 CULTURE SCREEN ONLY: CPT | Mod: WESLAB

## 2024-06-11 PROCEDURE — 36415 COLL VENOUS BLD VENIPUNCTURE: CPT

## 2024-06-11 PROCEDURE — 85730 THROMBOPLASTIN TIME PARTIAL: CPT

## 2024-06-11 PROCEDURE — 85610 PROTHROMBIN TIME: CPT

## 2024-06-11 RX ORDER — CHLORHEXIDINE GLUCONATE ORAL RINSE 1.2 MG/ML
SOLUTION DENTAL
Qty: 473 ML | Refills: 0 | Status: SHIPPED | OUTPATIENT
Start: 2024-06-11

## 2024-06-11 RX ORDER — DOXYCYCLINE 100 MG/1
100 TABLET ORAL 2 TIMES DAILY
COMMUNITY

## 2024-06-11 ASSESSMENT — ENCOUNTER SYMPTOMS
CONSTIPATION: 0
NECK PAIN: 0
LIGHT-HEADEDNESS: 0
NUMBNESS: 1
ABDOMINAL PAIN: 0
BACK PAIN: 0
NECK STIFFNESS: 0
PSYCHIATRIC NEGATIVE: 1
PALPITATIONS: 0
SHORTNESS OF BREATH: 0
WHEEZING: 0
COUGH: 0
DIZZINESS: 0
DIARRHEA: 0
ARTHRALGIAS: 0

## 2024-06-11 ASSESSMENT — DUKE ACTIVITY SCORE INDEX (DASI)
DASI METS SCORE: 9.9
CAN YOU DO YARD WORK LIKE RAKING LEAVES, WEEDING OR PUSHING A MOWER: YES
CAN YOU DO MODERATE WORK AROUND THE HOUSE LIKE VACUUMING, SWEEPING FLOORS OR CARRYING GROCERIES: YES
CAN YOU DO LIGHT WORK AROUND THE HOUSE LIKE DUSTING OR WASHING DISHES: YES
TOTAL_SCORE: 58.2
CAN YOU RUN A SHORT DISTANCE: YES
CAN YOU WALK A BLOCK OR TWO ON LEVEL GROUND: YES
CAN YOU PARTICIPATE IN STRENOUS SPORTS LIKE SWIMMING, SINGLES TENNIS, FOOTBALL, BASKETBALL, OR SKIING: YES
CAN YOU CLIMB A FLIGHT OF STAIRS OR WALK UP A HILL: YES
CAN YOU PARTICIPATE IN MODERATE RECREATIONAL ACTIVITIES LIKE GOLF, BOWLING, DANCING, DOUBLES TENNIS OR THROWING A BASEBALL OR FOOTBALL: YES
CAN YOU HAVE SEXUAL RELATIONS: YES
CAN YOU WALK INDOORS, SUCH AS AROUND YOUR HOUSE: YES
CAN YOU DO HEAVY WORK AROUND THE HOUSE LIKE SCRUBBING FLOORS OR LIFTING AND MOVING HEAVY FURNITURE: YES
CAN YOU TAKE CARE OF YOURSELF (EAT, DRESS, BATHE, OR USE TOILET): YES

## 2024-06-11 ASSESSMENT — PAIN SCALES - GENERAL: PAINLEVEL_OUTOF10: 0 - NO PAIN

## 2024-06-11 ASSESSMENT — PAIN - FUNCTIONAL ASSESSMENT: PAIN_FUNCTIONAL_ASSESSMENT: 0-10

## 2024-06-11 NOTE — PREPROCEDURE INSTRUCTIONS
Medication List            Accurate as of June 11, 2024  2:38 PM. Always use your most recent med list.                doxycycline 100 mg tablet  Commonly known as: Adoxa  Medication Adjustments for Surgery: Take morning of surgery with sip of water, no other fluids                 Preoperative Fasting Guidelines    Why must I stop eating and drinking near surgery time?  With sedation, food or liquid in your stomach can enter your lungs causing serious complications  Increases nausea and vomiting    When do I need to stop eating and drinking before my surgery?  Do not eat any food or drink any liquids after midnight the night before your surgery/procedure.  You may have sips of water to take medications.    PAT DISCHARGE INSTRUCTIONS    Please call the Same Day Surgery (SDS) Department of the hospital where your procedure will be performed after 2:00 PM the day before your surgery. If you are scheduled on a Monday, or a Tuesday following a Monday holiday, you will need to call on the last business day prior to your surgery.    [facility]  [address] [place] [address], [id number] [phone]  [address] [address] [phone]  [facility]  [address] [place] [address]  [phone]    Please let your surgeon know if:      You develop any open sores, shingles, burning or painful urination as these may increase your risk of an infection.   You no longer wish to have the surgery.   Any other personal circumstances change that may lead to the need to cancel or defer this surgery-such as being sick or getting admitted to any hospital within one week of your planned procedure.    Your contact details change, such as a change of address or phone number.    Starting now:     Please DO NOT drink alcohol or smoke for 24 hours before surgery. It is  well known that quitting smoking can make a huge difference to your health and recovery from surgery. The longer you abstain from smoking, the better your chances of a healthy recovery. If you need help with quitting, call 1-800-QUIT-NOW to be connected to a trained counselor who will discuss the best methods to help you quit.     Before your surgery:    Please stop all supplements 7 days prior to surgery. Or as directed by your surgeon.   Please stop taking NSAID pain medicine such as Advil and Motrin 7 days before surgery.    If you develop any fever, cough, cold, rashes, cuts, scratches, scrapes, urinary symptoms or infection anywhere on your body (including teeth and gums) prior to surgery, please call your surgeon’s office as soon as possible. This may require treatment to reduce the chance of cancellation on the day of surgery.    The day before your surgery:   DIET- Please follow the diet instructions at the top of your packet.   Get a good night’s rest.  Use the special soap for bathing if you have been instructed to use one.    Scheduled surgery times may change and you will be notified if this occurs - please check your personal voicemail for any updates.     On the morning of surgery:   Wear comfortable, loose fitting clothes which open in the front. Please do not wear moisturizers, creams, lotions, makeup or perfume.    Please bring with you to surgery:   Photo ID and insurance card   Current list of medicines and allergies   Pacemaker/ Defibrillator/Heart stent cards   CPAP machine and mask    Slings/ splints/ crutches   A copy of your complete advanced directive/DHPOA.    Please do NOT bring with you to surgery:   All jewelry and valuables should be left at home.   Prosthetic devices such as contact lenses, hearing aids, dentures, eyelash extensions, hairpins and body piercings must be removed prior to going in to the surgical suite.    After outpatient surgery:   A responsible adult MUST accompany you  at the time of discharge and stay with you for 24 hours after your surgery. You may NOT drive yourself home after surgery.    Do not drive, operate machinery, make critical decisions or do activities that require co-ordination or balance until after a night’s sleep.   Do not drink alcoholic beverages for 24 hours.   Instructions for resuming your medications will be provided by your surgeon.    CALL YOUR DOCTOR AFTER SURGERY IF YOU HAVE:     Chills and/or a fever of 101° F or higher.    Redness, swelling, pus or drainage from your surgical wound or a bad smell from the wound.    Lightheadedness, fainting or confusion.    Persistent vomiting (throwing up) and are not able to eat or drink for 12 hours.    Three or more loose, watery bowel movements in 24 hours (diarrhea).   Difficulty or pain while urinating( after non-urological surgery)    Pain and swelling in your legs, especially if it is only on one side.    Difficulty breathing or are breathing faster than normal.    Any new concerning symptoms.      Patient Information: Pre-Operative Infection Prevention Measures     Why did I have my nose, under my arms, and groin swabbed?  The purpose of the swab is to identify Staphylococcus aureus inside your nose or on your skin.  The swab was sent to the laboratory for culture.  A positive swab/culture for Staphylococcus aureus is called colonization or carriage.      What is Staphylococcus aureus?  Staphylococcus aureus, also known as “staph”, is a germ found on the skin or in the nose of healthy people.  Sometimes Staphylococcus aureus can get into the body and cause an infection.  This can be minor (such as pimples, boils, or other skin problems).  It might also be serious (such as a blood infection, pneumonia, or a surgical site infection).    What is Staphylococcus aureus colonization or carriage?  Colonization or carriage means that a person has the germ but is not sick from it.  These bacteria can be spread on the  hands or when breathing or sneezing.    How is Staphylococcus aureus spread?  It is most often spread by close contact with a person or item that carries it.    What happens if my culture is positive for Staphylococcus aureus?  Your doctor/medical team will use this information to guide any antibiotic treatment which may be necessary.  Regardless of the culture results, we will clean the inside of your nose with a betadine swab just before you have your surgery.      Will I get an infection if I have Staphylococcus aureus in my nose or on my skin?  Anyone can get an infection with Staphylococcus aureus.  However, the best way to reduce your risk of infection is to follow the instructions provided to you for the use of your CHG soap and dental rinse.        Patient Information: Oral/Dental Rinse    What is oral/dental rinse?   It is a mouthwash. It is a way of cleaning the mouth with a germ-killing solution before your surgery.  The solution contains chlorhexidine, commonly known as CHG.   It is used inside the mouth to kill a bacteria known as Staphylococcus aureus.  Let your doctor know if you are allergic to Chlorhexidine.    Why do I need to use CHG oral/dental rinse?  The CHG oral/dental rinse helps to kill a bacteria in your mouth known as Staphylococcus aureus.     This reduces the risk of infection at the surgical site.      Using your CHG oral/dental rinse  STEPS:  Use your CHG oral/dental rinse after you brush your teeth the night before (at bedtime) and the morning of your surgery.  Follow all directions on your prescription label.    Use the cap on the container to measure 15ml   Swish (gargle if you can) the mouthwash in your mouth for at least 30 seconds, (do not swallow) and spit out  After you use your CHG rinse, do not rinse your mouth with water, drink or eat.  Please refer to the prescription label for the appropriate time to resume oral intake      What side effects might I have using the CHG  oral/dental rinse?  CHG rinse will stick to plaque on the teeth.  Brush and floss just before use.  Teeth brushing will help avoid staining of plaque during use.      Patient Information: Home Preoperative Antibacterial Shower      What is a home preoperative antibacterial shower?  This shower is a way of cleaning the skin with a germ-killing solution before surgery.  The solution contains chlorhexidine, commonly known as CHG.  CHG is a skin cleanser with germ-killing ability.  Let your doctor know if you are allergic to chlorhexidine.    Why do I need to take a preoperative antibacterial shower?  Skin is not sterile.  It is best to try to make your skin as free of germs as possible before surgery.  Proper cleansing with a germ-killing soap before surgery can lower the number of germs on your skin.  This helps to reduce the risk of infection at the surgical site.  Following the instructions listed below will help you prepare your skin for surgery.      How do I use the solution?  Steps:  Begin using your CHG soap 5 days before your scheduled surgery on _____start wash 6/15/24___________________.    First, wash and rinse your hair using the CHG soap. Keep CHG soap away from ear canals and eyes.  Rinse completely, do not condition.  Hair extensions should be removed.  Wash your face with your normal soap and rinse.    Apply the CHG solution to a clean wet washcloth.  Turn the water off or move away from the water spray to avoid premature rinsing of the CHG soap as you are applying.   Firmly lather your entire body from the neck down.  Do not use on your face.  Pay special attention to the area(s) where your incision(s) will be located unless they are on your face.  Avoid scrubbing your skin too hard.  The important point is to have the CHG soap sit on your skin for 3 minutes.    When the 3 minutes are up, turn on the water and rinse the CHG solution off your body completely.   DO NOT wash with regular soap after you  have used the CHG soap solution  Pat yourself dry with a clean, freshly-laundered towel.  DO NOT apply powders, deodorants, or lotions.  Dress in clean, freshly laundered nightclothes.    Be sure to sleep with clean, freshly laundered sheets.  Be aware that CHG will cause stains on fabrics; if you wash them with bleach after use.  Rinse your washcloth and other linens that have contact with CHG completely.  Use only non-chlorine detergents to launder the items used.   The morning of surgery is the fifth day.  Repeat the above steps and dress in clean comfortable clothing     Whom should I contact if I have any questions regarding the use of CHG soap?  Call the [facility] at [phone] if you have any questions.

## 2024-06-11 NOTE — H&P
History Of Present Illness  Papa Linares is an otherwise healthy 29 y.o. male presenting with right sided thoracic outlet syndrome. Patient experiences loss of pulses, paleness, pain and numbness in his right hand with elevation of his right arm. Unable to lay on his side, experiences numbness even while holding steering wheel while driving. Similar but not as extensive symptoms in his left arm. Symptoms began over 10 years ago but deferred treatment while he was involved in sports, especially wrestling.      Past Medical History  No past medical history on file.    Surgical History  No past surgical history on file.     Social History  He reports that he has never smoked. He has never used smokeless tobacco. He reports that he does not currently use alcohol. He reports that he does not use drugs.    Family History  No family history on file.     Allergies  Patient has no known allergies.    Review of Systems   Respiratory:  Negative for cough, shortness of breath and wheezing.    Cardiovascular:  Negative for chest pain and palpitations.   Gastrointestinal:  Negative for abdominal pain, constipation and diarrhea.   Musculoskeletal:  Negative for arthralgias, back pain, neck pain and neck stiffness.   Skin:  Positive for rash.        Rosacea under eyes   Neurological:  Positive for numbness. Negative for dizziness and light-headedness.        See HPI   Psychiatric/Behavioral: Negative.          Physical Exam  Constitutional:       General: He is not in acute distress.     Appearance: Normal appearance.   HENT:      Head: Normocephalic and atraumatic.   Cardiovascular:      Rate and Rhythm: Normal rate and regular rhythm.      Heart sounds: Normal heart sounds. No murmur heard.  Pulmonary:      Breath sounds: Normal breath sounds. No wheezing or rhonchi.   Abdominal:      Palpations: Abdomen is soft.      Tenderness: There is no abdominal tenderness.   Musculoskeletal:         General: No deformity or signs of  "injury.      Cervical back: No rigidity or tenderness.   Skin:     General: Skin is warm and dry.   Neurological:      Mental Status: He is alert and oriented to person, place, and time.   Psychiatric:         Behavior: Behavior normal.          Last Recorded Vitals  Blood pressure 143/62, pulse 57, temperature 36.2 °C (97.2 °F), temperature source Temporal, resp. rate 16, height 1.651 m (5' 5\"), weight 77 kg (169 lb 12.1 oz), SpO2 100%.    Relevant Results      Vascular US upper extremity arterial duplex bilateral with Doppler TOS    Result Date: 5/28/2024           [place] [address]            Phone [phone]  Vascular Lab Report  [facility] US UPPER EXTREMITY ARTERIAL DUPLEX BILATERAL TOS Patient Name:      TOLU Mckinney Physician: [id number] Fatoumata Vasques MD Study Date:        5/28/2024           Ordering Provider: [id number] СЕРГЕЙ GAXIOLA MRN/PID:           [id number]            Fellow: Accession#:        [id number]        Technologist:      Maren Roca RVCARLOS Date of Birth/Age: 1995 / 28 years Technologist 2: Gender:            M                   Encounter#:        [id number] Admission Status:  Outpatient          Location           [facility]                                        Performed:  Diagnosis/ICD: Brachial plexus disorders-G54.0 Indication:    Thoracic outlet syndrome CPT Codes:     44619 Upper arterial Duplex  CONCLUSIONS: Right Upper Arterial: Subclavian artery is patent; however, vessel occludes with arm positioned at 90 degrees, suggestive of thoracic outlet compression. No evidence of aneurysm of right subclavian artery; vessel measures 1.2 cm at proximal, 0.8 cm at mid, and 0.7 cm at distal. Left Upper Arterial: Subclavian artery is patent; however, flow becomes severely dampened (141 cm/s to 23 cm/s) with arm positioned at 90 degrees, suggestive of thoracic outlet " compression. No evidence of aneurysm of left subclavian artery; vessel measures 0.9 cm at proximal, 0.8 cm at mid, and 0.8 cm at distal.  Imaging & Doppler Findings:   Right                                   Left   PSV    Waveform                        PSV    Waveform 120 cm/s Triphasic Subclavian Proximal 131 cm/s Triphasic 137 cm/s Triphasic   Subclavian Mid    141 cm/s Triphasic 71 cm/s  Triphasic  Subclavian Distal  98 cm/s  Triphasic  [id number] Fatoumata Vasques MD Electronically signed by [id number] Fatoumata Vasques MD on 5/28/2024 at 4:24:57 PM  ** Final **     Vascular US upper extremity arterial Doppler TOS (thoracic outlet syndrome)    Result Date: 5/28/2024           [place] [address]            Phone [phone]  Vascular Lab Report  [facility] US UPPER EXTREMITY PVR FOR TOS Patient Name:      TOLU Mckinney Physician: [id number] Vasques MD Study Date:        5/28/2024           Ordering Provider: [id number] СЕРГЕЙ GAXIOLA MRN/PID:           [id number]            Fellow: Accession#:        [id number]        Technologist:      Maren Roca RVCARLOS Date of Birth/Age: 1995 / 28 years Technologist 2: Gender:            M                   Encounter#:        [id number] Admission Status:  Outpatient          Location           [facility]                                        Performed:  Diagnosis/ICD: Brachial plexus disorders-G54.0 Indication:    Thoracic outlet syndrome CPT Codes:     29423.52 Peripheral artery PVR (multi segmental pressure) Reduced                Service  CONCLUSIONS: Right Upper PVR: A decrease in PPG waveform amplitude was noted during the arm at 90 degrees maneuver and arm at 180 degrees maneuver. Patient is symptomatic with arms at 90 and 180 degrees. Left Upper PVR: A decrease in PPG waveform amplitude was noted during the arm at 180 degrees maneuver. Patient is symptomatic with arms  at 90 and 180 degrees.  Imaging & Doppler Findings:  Right Outcome  TOS Maneuver   Left Outcome   negative       Baseline       negative   negative        Ware       negative   negative              negative   positive    Arm 180 degrees   positive   positive    Arm 90 degrees    positive                     Right     Left Brachial Pressure 130 mmHg 125 mmHg   [id number] Fatoumata Vasques MD Electronically signed by [id number] Fatoumata Vasques MD on 5/28/2024 at 4:22:11 PM  ** Final **        Assessment/Plan       Right sided thoracic outlet syndrome.  Patient is scheduled for a right first rib resection.       I spent 20 minutes in the professional and overall care of this patient.      Shawn Roberts PA-C

## 2024-06-13 LAB — STAPHYLOCOCCUS SPEC CULT: NORMAL

## 2024-06-18 ENCOUNTER — ANESTHESIA EVENT (OUTPATIENT)
Dept: OPERATING ROOM | Facility: HOSPITAL | Age: 29
End: 2024-06-18
Payer: COMMERCIAL

## 2024-06-19 ENCOUNTER — HOSPITAL ENCOUNTER (INPATIENT)
Facility: HOSPITAL | Age: 29
LOS: 1 days | Discharge: HOME | DRG: 030 | End: 2024-06-20
Attending: SURGERY | Admitting: SURGERY
Payer: COMMERCIAL

## 2024-06-19 ENCOUNTER — ANESTHESIA (OUTPATIENT)
Dept: OPERATING ROOM | Facility: HOSPITAL | Age: 29
End: 2024-06-19
Payer: COMMERCIAL

## 2024-06-19 ENCOUNTER — APPOINTMENT (OUTPATIENT)
Dept: RADIOLOGY | Facility: HOSPITAL | Age: 29
DRG: 030 | End: 2024-06-19
Payer: COMMERCIAL

## 2024-06-19 DIAGNOSIS — G54.0 THORACIC OUTLET SYNDROME: Primary | ICD-10-CM

## 2024-06-19 LAB
ABO GROUP (TYPE) IN BLOOD: NORMAL
RH FACTOR (ANTIGEN D): NORMAL

## 2024-06-19 PROCEDURE — 2500000004 HC RX 250 GENERAL PHARMACY W/ HCPCS (ALT 636 FOR OP/ED): Mod: JZ | Performed by: NURSE PRACTITIONER

## 2024-06-19 PROCEDURE — 7100000001 HC RECOVERY ROOM TIME - INITIAL BASE CHARGE: Performed by: SURGERY

## 2024-06-19 PROCEDURE — 71045 X-RAY EXAM CHEST 1 VIEW: CPT

## 2024-06-19 PROCEDURE — 2500000004 HC RX 250 GENERAL PHARMACY W/ HCPCS (ALT 636 FOR OP/ED): Performed by: ANESTHESIOLOGIST ASSISTANT

## 2024-06-19 PROCEDURE — 2500000001 HC RX 250 WO HCPCS SELF ADMINISTERED DRUGS (ALT 637 FOR MEDICARE OP): Performed by: NURSE PRACTITIONER

## 2024-06-19 PROCEDURE — 3600000003 HC OR TIME - INITIAL BASE CHARGE - PROCEDURE LEVEL THREE: Performed by: SURGERY

## 2024-06-19 PROCEDURE — 3700000001 HC GENERAL ANESTHESIA TIME - INITIAL BASE CHARGE: Performed by: SURGERY

## 2024-06-19 PROCEDURE — 88304 TISSUE EXAM BY PATHOLOGIST: CPT | Mod: TC | Performed by: SURGERY

## 2024-06-19 PROCEDURE — 7100000002 HC RECOVERY ROOM TIME - EACH INCREMENTAL 1 MINUTE: Performed by: SURGERY

## 2024-06-19 PROCEDURE — 2500000005 HC RX 250 GENERAL PHARMACY W/O HCPCS: Performed by: ANESTHESIOLOGIST ASSISTANT

## 2024-06-19 PROCEDURE — 3600000008 HC OR TIME - EACH INCREMENTAL 1 MINUTE - PROCEDURE LEVEL THREE: Performed by: SURGERY

## 2024-06-19 PROCEDURE — 2500000004 HC RX 250 GENERAL PHARMACY W/ HCPCS (ALT 636 FOR OP/ED): Mod: JZ | Performed by: SURGERY

## 2024-06-19 PROCEDURE — 2500000005 HC RX 250 GENERAL PHARMACY W/O HCPCS: Performed by: SURGERY

## 2024-06-19 PROCEDURE — 2500000004 HC RX 250 GENERAL PHARMACY W/ HCPCS (ALT 636 FOR OP/ED): Performed by: ANESTHESIOLOGY

## 2024-06-19 PROCEDURE — 36415 COLL VENOUS BLD VENIPUNCTURE: CPT | Performed by: ANESTHESIOLOGY

## 2024-06-19 PROCEDURE — 3700000002 HC GENERAL ANESTHESIA TIME - EACH INCREMENTAL 1 MINUTE: Performed by: SURGERY

## 2024-06-19 PROCEDURE — 2720000007 HC OR 272 NO HCPCS: Performed by: SURGERY

## 2024-06-19 PROCEDURE — 0PB10ZZ EXCISION OF 1 TO 2 RIBS, OPEN APPROACH: ICD-10-PCS | Performed by: SURGERY

## 2024-06-19 PROCEDURE — 71045 X-RAY EXAM CHEST 1 VIEW: CPT | Performed by: RADIOLOGY

## 2024-06-19 PROCEDURE — 21600 PARTIAL REMOVAL OF RIB: CPT | Performed by: SURGERY

## 2024-06-19 PROCEDURE — 1100000001 HC PRIVATE ROOM DAILY

## 2024-06-19 RX ORDER — CYCLOBENZAPRINE HCL 10 MG
5 TABLET ORAL 3 TIMES DAILY
Status: DISCONTINUED | OUTPATIENT
Start: 2024-06-19 | End: 2024-06-20 | Stop reason: HOSPADM

## 2024-06-19 RX ORDER — KETOROLAC TROMETHAMINE 30 MG/ML
30 INJECTION, SOLUTION INTRAMUSCULAR; INTRAVENOUS EVERY 6 HOURS PRN
Status: DISCONTINUED | OUTPATIENT
Start: 2024-06-19 | End: 2024-06-20 | Stop reason: HOSPADM

## 2024-06-19 RX ORDER — FENTANYL CITRATE 50 UG/ML
INJECTION, SOLUTION INTRAMUSCULAR; INTRAVENOUS AS NEEDED
Status: DISCONTINUED | OUTPATIENT
Start: 2024-06-19 | End: 2024-06-19

## 2024-06-19 RX ORDER — TALC
3 POWDER (GRAM) TOPICAL NIGHTLY
Status: DISCONTINUED | OUTPATIENT
Start: 2024-06-19 | End: 2024-06-20 | Stop reason: HOSPADM

## 2024-06-19 RX ORDER — ONDANSETRON HYDROCHLORIDE 2 MG/ML
INJECTION, SOLUTION INTRAVENOUS AS NEEDED
Status: DISCONTINUED | OUTPATIENT
Start: 2024-06-19 | End: 2024-06-19

## 2024-06-19 RX ORDER — SCOLOPAMINE TRANSDERMAL SYSTEM 1 MG/1
1 PATCH, EXTENDED RELEASE TRANSDERMAL
Status: DISCONTINUED | OUTPATIENT
Start: 2024-06-19 | End: 2024-06-20 | Stop reason: HOSPADM

## 2024-06-19 RX ORDER — ACETAMINOPHEN 160 MG/5ML
650 SOLUTION ORAL EVERY 12 HOURS
Status: DISCONTINUED | OUTPATIENT
Start: 2024-06-19 | End: 2024-06-20 | Stop reason: HOSPADM

## 2024-06-19 RX ORDER — ACETAMINOPHEN 325 MG/1
650 TABLET ORAL EVERY 12 HOURS
Status: DISCONTINUED | OUTPATIENT
Start: 2024-06-19 | End: 2024-06-20 | Stop reason: HOSPADM

## 2024-06-19 RX ORDER — DEXMEDETOMIDINE HYDROCHLORIDE 100 UG/ML
INJECTION, SOLUTION INTRAVENOUS AS NEEDED
Status: DISCONTINUED | OUTPATIENT
Start: 2024-06-19 | End: 2024-06-19

## 2024-06-19 RX ORDER — ONDANSETRON HYDROCHLORIDE 2 MG/ML
4 INJECTION, SOLUTION INTRAVENOUS ONCE AS NEEDED
Status: DISCONTINUED | OUTPATIENT
Start: 2024-06-19 | End: 2024-06-19 | Stop reason: HOSPADM

## 2024-06-19 RX ORDER — LIDOCAINE HYDROCHLORIDE 10 MG/ML
0.1 INJECTION INFILTRATION; PERINEURAL ONCE
Status: DISCONTINUED | OUTPATIENT
Start: 2024-06-19 | End: 2024-06-19 | Stop reason: HOSPADM

## 2024-06-19 RX ORDER — PHENYLEPHRINE HCL IN 0.9% NACL 1 MG/10 ML
SYRINGE (ML) INTRAVENOUS AS NEEDED
Status: DISCONTINUED | OUTPATIENT
Start: 2024-06-19 | End: 2024-06-19

## 2024-06-19 RX ORDER — LIDOCAINE HYDROCHLORIDE 10 MG/ML
INJECTION INFILTRATION; PERINEURAL AS NEEDED
Status: DISCONTINUED | OUTPATIENT
Start: 2024-06-19 | End: 2024-06-19

## 2024-06-19 RX ORDER — ACETAMINOPHEN 325 MG/1
650 TABLET ORAL EVERY 4 HOURS PRN
Status: DISCONTINUED | OUTPATIENT
Start: 2024-06-19 | End: 2024-06-20 | Stop reason: HOSPADM

## 2024-06-19 RX ORDER — DOCUSATE SODIUM 100 MG/1
100 CAPSULE, LIQUID FILLED ORAL 2 TIMES DAILY
Status: DISCONTINUED | OUTPATIENT
Start: 2024-06-19 | End: 2024-06-20 | Stop reason: HOSPADM

## 2024-06-19 RX ORDER — CEFAZOLIN SODIUM 2 G/100ML
2 INJECTION, SOLUTION INTRAVENOUS EVERY 8 HOURS
Status: COMPLETED | OUTPATIENT
Start: 2024-06-19 | End: 2024-06-20

## 2024-06-19 RX ORDER — FENTANYL CITRATE 50 UG/ML
50 INJECTION, SOLUTION INTRAMUSCULAR; INTRAVENOUS EVERY 5 MIN PRN
Status: DISCONTINUED | OUTPATIENT
Start: 2024-06-19 | End: 2024-06-19 | Stop reason: HOSPADM

## 2024-06-19 RX ORDER — BUPIVACAINE HYDROCHLORIDE 5 MG/ML
INJECTION, SOLUTION EPIDURAL; INTRACAUDAL AS NEEDED
Status: DISCONTINUED | OUTPATIENT
Start: 2024-06-19 | End: 2024-06-19 | Stop reason: HOSPADM

## 2024-06-19 RX ORDER — CELECOXIB 50 MG/1
50 CAPSULE ORAL EVERY 12 HOURS
Status: DISCONTINUED | OUTPATIENT
Start: 2024-06-19 | End: 2024-06-19

## 2024-06-19 RX ORDER — ONDANSETRON 4 MG/1
4 TABLET, ORALLY DISINTEGRATING ORAL EVERY 8 HOURS PRN
Status: DISCONTINUED | OUTPATIENT
Start: 2024-06-19 | End: 2024-06-20 | Stop reason: HOSPADM

## 2024-06-19 RX ORDER — CEFAZOLIN 1 G/1
INJECTION, POWDER, FOR SOLUTION INTRAVENOUS AS NEEDED
Status: DISCONTINUED | OUTPATIENT
Start: 2024-06-19 | End: 2024-06-19

## 2024-06-19 RX ORDER — OXYCODONE HYDROCHLORIDE 5 MG/1
5 TABLET ORAL EVERY 4 HOURS PRN
Status: DISCONTINUED | OUTPATIENT
Start: 2024-06-19 | End: 2024-06-19 | Stop reason: HOSPADM

## 2024-06-19 RX ORDER — SODIUM CHLORIDE, SODIUM LACTATE, POTASSIUM CHLORIDE, CALCIUM CHLORIDE 600; 310; 30; 20 MG/100ML; MG/100ML; MG/100ML; MG/100ML
100 INJECTION, SOLUTION INTRAVENOUS CONTINUOUS
Status: DISCONTINUED | OUTPATIENT
Start: 2024-06-19 | End: 2024-06-19 | Stop reason: HOSPADM

## 2024-06-19 RX ORDER — LABETALOL HYDROCHLORIDE 5 MG/ML
5 INJECTION, SOLUTION INTRAVENOUS ONCE AS NEEDED
Status: DISCONTINUED | OUTPATIENT
Start: 2024-06-19 | End: 2024-06-19 | Stop reason: HOSPADM

## 2024-06-19 RX ORDER — CELECOXIB 100 MG/1
100 CAPSULE ORAL 2 TIMES DAILY
Status: DISCONTINUED | OUTPATIENT
Start: 2024-06-19 | End: 2024-06-20 | Stop reason: HOSPADM

## 2024-06-19 RX ORDER — SUCCINYLCHOLINE CHLORIDE 20 MG/ML
INJECTION INTRAMUSCULAR; INTRAVENOUS AS NEEDED
Status: DISCONTINUED | OUTPATIENT
Start: 2024-06-19 | End: 2024-06-19

## 2024-06-19 RX ORDER — ONDANSETRON HYDROCHLORIDE 2 MG/ML
4 INJECTION, SOLUTION INTRAVENOUS EVERY 8 HOURS PRN
Status: DISCONTINUED | OUTPATIENT
Start: 2024-06-19 | End: 2024-06-20 | Stop reason: HOSPADM

## 2024-06-19 RX ORDER — DIAZEPAM 5 MG/ML
5 INJECTION, SOLUTION INTRAMUSCULAR; INTRAVENOUS EVERY 8 HOURS
Status: DISCONTINUED | OUTPATIENT
Start: 2024-06-19 | End: 2024-06-19

## 2024-06-19 RX ORDER — PROPOFOL 10 MG/ML
INJECTION, EMULSION INTRAVENOUS AS NEEDED
Status: DISCONTINUED | OUTPATIENT
Start: 2024-06-19 | End: 2024-06-19

## 2024-06-19 RX ORDER — ACETAMINOPHEN 160 MG/5ML
650 SOLUTION ORAL EVERY 4 HOURS PRN
Status: DISCONTINUED | OUTPATIENT
Start: 2024-06-19 | End: 2024-06-20 | Stop reason: HOSPADM

## 2024-06-19 RX ORDER — LIDOCAINE HYDROCHLORIDE AND EPINEPHRINE 20; 10 MG/ML; UG/ML
INJECTION, SOLUTION INFILTRATION; PERINEURAL AS NEEDED
Status: DISCONTINUED | OUTPATIENT
Start: 2024-06-19 | End: 2024-06-19 | Stop reason: HOSPADM

## 2024-06-19 RX ORDER — MIDAZOLAM HYDROCHLORIDE 1 MG/ML
INJECTION, SOLUTION INTRAMUSCULAR; INTRAVENOUS AS NEEDED
Status: DISCONTINUED | OUTPATIENT
Start: 2024-06-19 | End: 2024-06-19

## 2024-06-19 RX ORDER — FENTANYL CITRATE 50 UG/ML
25 INJECTION, SOLUTION INTRAMUSCULAR; INTRAVENOUS EVERY 5 MIN PRN
Status: DISCONTINUED | OUTPATIENT
Start: 2024-06-19 | End: 2024-06-19 | Stop reason: HOSPADM

## 2024-06-19 RX ORDER — HYDROMORPHONE HYDROCHLORIDE 2 MG/ML
INJECTION, SOLUTION INTRAMUSCULAR; INTRAVENOUS; SUBCUTANEOUS AS NEEDED
Status: DISCONTINUED | OUTPATIENT
Start: 2024-06-19 | End: 2024-06-19

## 2024-06-19 SDOH — SOCIAL STABILITY: SOCIAL INSECURITY: DO YOU FEEL UNSAFE GOING BACK TO THE PLACE WHERE YOU ARE LIVING?: NO

## 2024-06-19 SDOH — SOCIAL STABILITY: SOCIAL INSECURITY: HAVE YOU HAD ANY THOUGHTS OF HARMING ANYONE ELSE?: NO

## 2024-06-19 SDOH — SOCIAL STABILITY: SOCIAL INSECURITY: HAS ANYONE EVER THREATENED TO HURT YOUR FAMILY OR YOUR PETS?: NO

## 2024-06-19 SDOH — SOCIAL STABILITY: SOCIAL INSECURITY: HAVE YOU HAD THOUGHTS OF HARMING ANYONE ELSE?: NO

## 2024-06-19 SDOH — SOCIAL STABILITY: SOCIAL INSECURITY: ARE THERE ANY APPARENT SIGNS OF INJURIES/BEHAVIORS THAT COULD BE RELATED TO ABUSE/NEGLECT?: NO

## 2024-06-19 SDOH — SOCIAL STABILITY: SOCIAL INSECURITY: ABUSE: ADULT

## 2024-06-19 SDOH — SOCIAL STABILITY: SOCIAL INSECURITY: ARE YOU OR HAVE YOU BEEN THREATENED OR ABUSED PHYSICALLY, EMOTIONALLY, OR SEXUALLY BY ANYONE?: NO

## 2024-06-19 SDOH — SOCIAL STABILITY: SOCIAL INSECURITY: DO YOU FEEL ANYONE HAS EXPLOITED OR TAKEN ADVANTAGE OF YOU FINANCIALLY OR OF YOUR PERSONAL PROPERTY?: NO

## 2024-06-19 SDOH — SOCIAL STABILITY: SOCIAL INSECURITY: DOES ANYONE TRY TO KEEP YOU FROM HAVING/CONTACTING OTHER FRIENDS OR DOING THINGS OUTSIDE YOUR HOME?: NO

## 2024-06-19 SDOH — SOCIAL STABILITY: SOCIAL INSECURITY: WERE YOU ABLE TO COMPLETE ALL THE BEHAVIORAL HEALTH SCREENINGS?: YES

## 2024-06-19 ASSESSMENT — COGNITIVE AND FUNCTIONAL STATUS - GENERAL
PATIENT BASELINE BEDBOUND: NO
DAILY ACTIVITIY SCORE: 24
MOBILITY SCORE: 24
DAILY ACTIVITIY SCORE: 24
MOBILITY SCORE: 24

## 2024-06-19 ASSESSMENT — PAIN SCALES - GENERAL
PAINLEVEL_OUTOF10: 0 - NO PAIN
PAINLEVEL_OUTOF10: 0 - NO PAIN
PAINLEVEL_OUTOF10: 8
PAINLEVEL_OUTOF10: 4
PAINLEVEL_OUTOF10: 4
PAINLEVEL_OUTOF10: 0 - NO PAIN

## 2024-06-19 ASSESSMENT — PAIN - FUNCTIONAL ASSESSMENT
PAIN_FUNCTIONAL_ASSESSMENT: 0-10
PAIN_FUNCTIONAL_ASSESSMENT: CPOT (CRITICAL CARE PAIN OBSERVATION TOOL)
PAIN_FUNCTIONAL_ASSESSMENT: 0-10
PAIN_FUNCTIONAL_ASSESSMENT: 0-10
PAIN_FUNCTIONAL_ASSESSMENT: CPOT (CRITICAL CARE PAIN OBSERVATION TOOL)
PAIN_FUNCTIONAL_ASSESSMENT: 0-10

## 2024-06-19 ASSESSMENT — PATIENT HEALTH QUESTIONNAIRE - PHQ9
1. LITTLE INTEREST OR PLEASURE IN DOING THINGS: NOT AT ALL
2. FEELING DOWN, DEPRESSED OR HOPELESS: NOT AT ALL
SUM OF ALL RESPONSES TO PHQ9 QUESTIONS 1 & 2: 0

## 2024-06-19 ASSESSMENT — LIFESTYLE VARIABLES
AUDIT-C TOTAL SCORE: 0
SKIP TO QUESTIONS 9-10: 1
PRESCIPTION_ABUSE_PAST_12_MONTHS: NO
HOW OFTEN DO YOU HAVE 6 OR MORE DRINKS ON ONE OCCASION: NEVER
HOW MANY STANDARD DRINKS CONTAINING ALCOHOL DO YOU HAVE ON A TYPICAL DAY: PATIENT DOES NOT DRINK
HOW OFTEN DO YOU HAVE A DRINK CONTAINING ALCOHOL: NEVER
SUBSTANCE_ABUSE_PAST_12_MONTHS: NO
AUDIT-C TOTAL SCORE: 0

## 2024-06-19 ASSESSMENT — ACTIVITIES OF DAILY LIVING (ADL)
ASSISTIVE_DEVICE: EYEGLASSES
HEARING - RIGHT EAR: FUNCTIONAL
DRESSING YOURSELF: INDEPENDENT
JUDGMENT_ADEQUATE_SAFELY_COMPLETE_DAILY_ACTIVITIES: YES
PATIENT'S MEMORY ADEQUATE TO SAFELY COMPLETE DAILY ACTIVITIES?: YES
FEEDING YOURSELF: INDEPENDENT
LACK_OF_TRANSPORTATION: NO
GROOMING: INDEPENDENT
HEARING - LEFT EAR: FUNCTIONAL
ADEQUATE_TO_COMPLETE_ADL: YES
TOILETING: INDEPENDENT
WALKS IN HOME: INDEPENDENT
BATHING: INDEPENDENT

## 2024-06-19 ASSESSMENT — COLUMBIA-SUICIDE SEVERITY RATING SCALE - C-SSRS
2. HAVE YOU ACTUALLY HAD ANY THOUGHTS OF KILLING YOURSELF?: NO
6. HAVE YOU EVER DONE ANYTHING, STARTED TO DO ANYTHING, OR PREPARED TO DO ANYTHING TO END YOUR LIFE?: NO
1. IN THE PAST MONTH, HAVE YOU WISHED YOU WERE DEAD OR WISHED YOU COULD GO TO SLEEP AND NOT WAKE UP?: NO

## 2024-06-19 ASSESSMENT — PAIN DESCRIPTION - DESCRIPTORS
DESCRIPTORS: ACHING
DESCRIPTORS: ACHING

## 2024-06-19 ASSESSMENT — PAIN DESCRIPTION - ORIENTATION: ORIENTATION: RIGHT

## 2024-06-19 ASSESSMENT — PAIN DESCRIPTION - LOCATION: LOCATION: SHOULDER

## 2024-06-19 NOTE — ANESTHESIA PREPROCEDURE EVALUATION
Patient: Papa Linares    Procedure Information       Date/Time: 06/19/24 0800    Procedure: Excision Bone Rib (Right)    Location: [facility] OR 08 / [facility]    Surgeons: Sofia Layton MD            Relevant Problems   No relevant active problems     History reviewed. No pertinent past medical history.     Clinical information reviewed:   Tobacco  Allergies  Meds   Med Hx  Surg Hx   Fam Hx  Soc Hx        NPO Detail:  NPO/Void Status  Carbohydrate Drink Given Prior to Surgery? : N  Date of Last Liquid: 06/18/24  Time of Last Liquid: 2000  Date of Last Solid: 06/18/24  Time of Last Solid: 2100  Last Intake Type: Clear fluids  Time of Last Void: 0645         Physical Exam    Airway  Mallampati: II  TM distance: >3 FB  Neck ROM: full     Cardiovascular    Dental - normal exam     Pulmonary    Abdominal            Anesthesia Plan    History of general anesthesia?: yes  History of complications of general anesthesia?: no    ASA 1     general     intravenous induction   Anesthetic plan and risks discussed with patient.    Plan discussed with CAA.

## 2024-06-19 NOTE — ANESTHESIA PROCEDURE NOTES
Peripheral IV  Date/Time: 6/19/2024 8:16 AM  Inserted by: LENNY Kingston    Placement  Needle size: 18 G  Laterality: left  Location: forearm  Local anesthetic: none  Site prep: alcohol  Technique: anatomical landmarks  Attempts: 1

## 2024-06-19 NOTE — ANESTHESIA POSTPROCEDURE EVALUATION
Patient: Papa Linares    Procedure Summary       Date: 06/19/24 Room / Location: [facility] OR 08 / [facility]    Anesthesia Start: 0758 Anesthesia Stop: 1146    Procedure: Excision Bone Rib (Right) Diagnosis:       Thoracic outlet syndrome      (Thoracic outlet syndrome [G54.0])    Surgeons: Sofia Layton MD Responsible Provider: You Martin MD    Anesthesia Type: general ASA Status: 1            Anesthesia Type: general    Vitals Value Taken Time   BP 90/47 06/19/24 1216   Temp 36.4 °C (97.5 °F) 06/19/24 1215   Pulse 62 06/19/24 1217   Resp 17 06/19/24 1216   SpO2 96 % 06/19/24 1217   Vitals shown include unfiled device data.    Anesthesia Post Evaluation    Patient location during evaluation: PACU  Patient participation: complete - patient participated  Level of consciousness: awake  Pain management: adequate  Airway patency: patent  Cardiovascular status: acceptable  Respiratory status: acceptable  Hydration status: acceptable  Postoperative Nausea and Vomiting: none        There were no known notable events for this encounter.

## 2024-06-19 NOTE — PROGRESS NOTES
Xray shows absence of right first rib and no pneumothorax, good diaphragmatic (phrenic nerve) function.   06/19/24 at 2:50 PM - Sofia Layton MD

## 2024-06-19 NOTE — INTERVAL H&P NOTE
H&P reviewed. The patient was examined and there are no changes to the H&P.    History from original office encounter 4/23/2024  Comes for evaluation of thoracic outlet syndrome. About 15 years ago he injured his right shoulder and began having symptoms of right arm forearm and hand numbness and weakness with use. He has seen orthopedic surgeons and was diagnosed with thoracic outlet syndrome in the past and has undergone physical therapy. The physical therapy has increased range of motion but not the symptoms. He cannot use his right arm basically above his head for any length of time without it becoming numb and weak. He denies any pain. He reports he has had an EMG which showed normal nerve function. He had an MRI of the shoulder showing a labral tear that is chronic but his physician will not treat this until the issue of his thoracic outlet is addressed. He denies any neck pain. He works as a  and at the [place]     Note  from 5/28/2024 encounter  The patient comes back after obtaining imaging. The CT scan shows no extra ribs and with the arm raised passively no significant compression is visualized. On noninvasive vascular imaging compression is seen on several stress positions in particular the 90 degree extension of the arm completely ablates flow in the subclavian artery. The patient has had symptoms for several years and is currently worsening. Physical therapy has been offered to him. In the past and even recently physical therapy has only increased his range of motion without improving his symptoms. He does have a torn labrum of the right shoulder but is deferring surgery until he gets the thoracic outlet repaired.

## 2024-06-19 NOTE — CARE PLAN
The patient's goals for the shift include Walk    The clinical goals for the shift include Manage pain

## 2024-06-19 NOTE — NURSING NOTE
Accepted pt from PACU. Wife, Mandy is present. TEDs applied. SCD's applied. VSS. RT side surgical sites intact (neck and chest). TELE applied. Pt denies pain at this time.

## 2024-06-19 NOTE — TREATMENT PLAN
29-year-old male status post right first rib resection by Dr. Layton on 6/19    Admit to RN F with telemetry  -Chest x-ray in PACU  - Tylenol around-the-clock  - Valium 5 mg every 8 hours for muscle spasms  - Celebrex 50 mg every 12 hours  - Toradol as needed for pain  - Zofran for nausea  - Adult diet  - Sequential teds for DVT prophylaxis  - Stool softeners for constipation  - PT for right arm strengthening and stretching  - Out of bed with bathroom privileges  - Patient will need follow-up with Dr. Layton  - Hopeful for discharge on 620  - Will need prescriptions for Valium and Celebrex.

## 2024-06-19 NOTE — ANESTHESIA PROCEDURE NOTES
Airway  Date/Time: 6/19/2024 8:06 AM  Urgency: elective    Airway not difficult    Staffing  Performed: LENNY   Authorized by: You Martin MD    Performed by: LENNY Kingston  Patient location during procedure: OR    Indications and Patient Condition  Indications for airway management: anesthesia  Spontaneous Ventilation: absent  Sedation level: deep  Preoxygenated: yes  Patient position: sniffing  Mask difficulty assessment: 1 - vent by mask    Final Airway Details  Final airway type: endotracheal airway      Successful airway: ETT  Cuffed: yes   Successful intubation technique: direct laryngoscopy  Facilitating devices/methods: intubating stylet  Endotracheal tube insertion site: oral  Blade: Rick  Blade size: #3  ETT size (mm): 8.0  Cormack-Lehane Classification: grade I - full view of glottis  Placement verified by: capnometry   Measured from: lips  ETT to lips (cm): 24  Number of attempts at approach: 1  Number of other approaches attempted: 1    Other Attempts  Unsuccessful attempted airways: endotracheal tube  Unsuccessful attempted endotracheal techniques: video laryngoscopy    Additional Comments  Initial attempt by paramedic studentDayo.  Successful second attempt with traditional DL by MILDRED.  Lips and teeth in preanesthetic condition.

## 2024-06-19 NOTE — OP NOTE
Excision Bone Rib (R) Operative Note     Date: 2024  OR Location: [facility]    Name: Papa Linares, : 1995, Age: 29 y.o., MRN: [id number], Sex: male    Diagnosis  Pre-op Diagnosis     * Thoracic outlet syndrome [G54.0] Post-op Diagnosis     * Thoracic outlet syndrome [G54.0]     Procedures  Excision Bone Rib  83089 - [place] EXCISION RIB PARTIAL      Surgeons      * Sofia Layton - Primary    Resident/Fellow/Other Assistant:  Surgeons and Role:  * No surgeons found with a matching role *    Procedure Summary  Anesthesia: General  ASA: I  Anesthesia Staff: Anesthesiologist: You Martin MD  C-AA: LENNY Kingston  Estimated Blood Loss: 5mL  Intra-op Medications:   Administrations occurring from 0800 to 1045 on 24:   Medication Name Total Dose   lidocaine-epinephrine (Xylocaine W/EPI) 2 %-1:100,000 injection 30 mL   bupivacaine PF (Marcaine) 0.5 % (5 mg/mL) injection 30 mL              Anesthesia Record               Intraprocedure I/O Totals          Intake    LR bolus 1250.00 mL    Total Intake 1250 mL       Output    Est. Blood Loss 50 mL    Total Output 50 mL       Net    Net Volume 1200 mL          Specimen:   ID Type Source Tests Collected by Time   1 : Right side first rib. Tissue RIB RIGHT SURGICAL PATHOLOGY EXAM Sofia Layton MD 2024 1020        Staff:   Sonidoulator: Imani Redmond Person: Namita  Scrub Person: You  Scrub Person: Olga  Circulator: Ara         Drains and/or Catheters: * None in log *    Tourniquet Times:         Implants:     Findings: first rib removed     Indications: Papa Linares is an 29 y.o. male who is having surgery for Thoracic outlet syndrome [G54.0].     The patient was seen in the preoperative area. The risks, benefits, complications, treatment options, non-operative alternatives, expected recovery and outcomes were discussed with the patient. The possibilities of reaction to medication, pulmonary aspiration, injury to surrounding structures,  bleeding, recurrent infection, the need for additional procedures, failure to diagnose a condition, and creating a complication requiring transfusion or operation were discussed with the patient. The specifics of first rib resection were discussed including the possibility of nerve injuries and the addition of pectoralis minor release was also discussed and the patient was agreeable to proceeding.The patient concurred with the proposed plan, giving informed consent.  The site of surgery was properly noted/marked if necessary per policy. The patient has been actively warmed in preoperative area. Preoperative antibiotics are not indicated. Venous thrombosis prophylaxis are not indicated.        Procedure Details:   Supine position, right neck prepped and draped.  The supraclavicular space open me a oblique incision along skin lines at the base of the neck.  The patient had a very thick platysmal layer and cautery energy transmitted very easily to the nerves underneath and therefore I generally avoided cautery at this stage of the incision.  The platysma was divided and flaps were raised.  The supraclavicular fat pad which was under very dense connective tissue was mobilized and retracted cephalad.  This revealed the brachial plexus.  The certified a massive muscle was also mobilized and retracted medially.  The trapezius muscle was mobilized.  The anterior scalene muscle was identified after dividing the omohyoid.  The phrenic nerve was visualized and confirmed with nerve stimulator.  The anterior scalene was dissected down to the first rib and divided here.  The anterior scalene was then retracted medially and with care to avoid injury to the phrenic nerve.  The first rib was exposed medially and stripped the periosteum.  The subclavian artery was controlled with a loop and the brachial plexus trunks were mobilized carefully to allow for anterior and posterior retraction to reveal the middle scalene muscles which  were also stripped off of the first rib.  Once the first rib underneath the brachial plexus was fully exposed anteriorly and posteriorly, rib cutters were used to divide the ribs and this dissection was carefully removed and sent for pathology.  The end remnant ribs were exposed and cut back with Kerrison osteotomes.  The rough edges were then smoothed.  Hemostasis was obtained.  Nerve stimulation to the plexus fibers and long thoracic and phrenic nerves confirmed functioning of these nerves.  The wound was irrigated and closed by reapposed in the fat pad and then the platysma layer with Vicryl and then the skin was closed with Monocryl.  The infraclavicular space was opened and the pectoralis major muscles were retracted by muscle-splitting technique to reveal the pectoralis minor insertion to the coracoid process.  The pectoralis muscle was divided with the LigaSure.  This freed any further compression on the plexus and artery and is heavily muscled man.  This wound was then irrigated with saline and closed with Vicryl and Monocryl.      Complications:  None; patient tolerated the procedure well.    Disposition: PACU - hemodynamically stable.  Condition: stable         Additional Details:     Attending Attestation: I was present for the entire procedure.    [facility]  Phone Number: [phone]

## 2024-06-20 VITALS
DIASTOLIC BLOOD PRESSURE: 46 MMHG | OXYGEN SATURATION: 94 % | TEMPERATURE: 98.8 F | SYSTOLIC BLOOD PRESSURE: 133 MMHG | BODY MASS INDEX: 28.1 KG/M2 | WEIGHT: 168.65 LBS | HEART RATE: 61 BPM | RESPIRATION RATE: 16 BRPM | HEIGHT: 65 IN

## 2024-06-20 LAB
ALBUMIN SERPL-MCNC: 3.6 G/DL (ref 3.5–5)
ALP BLD-CCNC: 47 U/L (ref 35–125)
ALT SERPL-CCNC: 20 U/L (ref 5–40)
ANION GAP SERPL CALC-SCNC: 11 MMOL/L
AST SERPL-CCNC: 36 U/L (ref 5–40)
BILIRUB SERPL-MCNC: 0.3 MG/DL (ref 0.1–1.2)
BUN SERPL-MCNC: 14 MG/DL (ref 8–25)
CALCIUM SERPL-MCNC: 8.7 MG/DL (ref 8.5–10.4)
CHLORIDE SERPL-SCNC: 105 MMOL/L (ref 97–107)
CO2 SERPL-SCNC: 23 MMOL/L (ref 24–31)
CREAT SERPL-MCNC: 1.2 MG/DL (ref 0.4–1.6)
EGFRCR SERPLBLD CKD-EPI 2021: 83 ML/MIN/1.73M*2
ERYTHROCYTE [DISTWIDTH] IN BLOOD BY AUTOMATED COUNT: 11.8 % (ref 11.5–14.5)
GLUCOSE SERPL-MCNC: 121 MG/DL (ref 65–99)
HCT VFR BLD AUTO: 36.1 % (ref 41–52)
HGB BLD-MCNC: 12.9 G/DL (ref 13.5–17.5)
MCH RBC QN AUTO: 29.4 PG (ref 26–34)
MCHC RBC AUTO-ENTMCNC: 35.7 G/DL (ref 32–36)
MCV RBC AUTO: 82 FL (ref 80–100)
NRBC BLD-RTO: 0 /100 WBCS (ref 0–0)
PLATELET # BLD AUTO: 192 X10*3/UL (ref 150–450)
POTASSIUM SERPL-SCNC: 4 MMOL/L (ref 3.4–5.1)
PROT SERPL-MCNC: 6 G/DL (ref 5.9–7.9)
RBC # BLD AUTO: 4.39 X10*6/UL (ref 4.5–5.9)
SODIUM SERPL-SCNC: 139 MMOL/L (ref 133–145)
WBC # BLD AUTO: 9.1 X10*3/UL (ref 4.4–11.3)

## 2024-06-20 PROCEDURE — 99239 HOSP IP/OBS DSCHRG MGMT >30: CPT | Performed by: NURSE PRACTITIONER

## 2024-06-20 PROCEDURE — 80053 COMPREHEN METABOLIC PANEL: CPT | Performed by: NURSE PRACTITIONER

## 2024-06-20 PROCEDURE — 36415 COLL VENOUS BLD VENIPUNCTURE: CPT | Performed by: NURSE PRACTITIONER

## 2024-06-20 PROCEDURE — 2500000001 HC RX 250 WO HCPCS SELF ADMINISTERED DRUGS (ALT 637 FOR MEDICARE OP): Performed by: NURSE PRACTITIONER

## 2024-06-20 PROCEDURE — 85027 COMPLETE CBC AUTOMATED: CPT | Performed by: NURSE PRACTITIONER

## 2024-06-20 RX ORDER — CYCLOBENZAPRINE HCL 5 MG
5 TABLET ORAL EVERY 8 HOURS PRN
Qty: 15 TABLET | Refills: 0 | Status: SHIPPED | OUTPATIENT
Start: 2024-06-20

## 2024-06-20 RX ORDER — CELECOXIB 100 MG/1
100 CAPSULE ORAL 2 TIMES DAILY PRN
Qty: 20 CAPSULE | Refills: 0 | Status: SHIPPED | OUTPATIENT
Start: 2024-06-20 | End: 2024-06-30

## 2024-06-20 ASSESSMENT — COGNITIVE AND FUNCTIONAL STATUS - GENERAL
DAILY ACTIVITIY SCORE: 24
MOBILITY SCORE: 24

## 2024-06-20 ASSESSMENT — PAIN - FUNCTIONAL ASSESSMENT: PAIN_FUNCTIONAL_ASSESSMENT: 0-10

## 2024-06-20 ASSESSMENT — PAIN SCALES - GENERAL: PAINLEVEL_OUTOF10: 2

## 2024-06-20 NOTE — CARE PLAN
The patient's goals for the shift include Walk    The clinical goals for the shift include pain management, safety    Problem: Pain  Goal: Takes deep breaths with improved pain control throughout the shift  6/19/2024 2128 by Alexandria Auguste RN  Outcome: Progressing  6/19/2024 2128 by Alexandria Auguste RN  Outcome: Progressing  Goal: Turns in bed with improved pain control throughout the shift  6/19/2024 2128 by Alexandria Auguste RN  Outcome: Progressing  6/19/2024 2128 by Alexandria Auguste RN  Outcome: Progressing  Goal: Walks with improved pain control throughout the shift  6/19/2024 2128 by Alexandria Auguste RN  Outcome: Progressing  6/19/2024 2128 by Alexandria Auguste RN  Outcome: Progressing  Goal: Performs ADL's with improved pain control throughout shift  6/19/2024 2128 by Alexandria Auguste RN  Outcome: Progressing  6/19/2024 2128 by Alexandria Auguste RN  Outcome: Progressing  Goal: Participates in PT with improved pain control throughout the shift  6/19/2024 2128 by Alexandria Auguste RN  Outcome: Progressing  6/19/2024 2128 by Alexandria Auguste RN  Outcome: Progressing  Goal: Free from opioid side effects throughout the shift  6/19/2024 2128 by Alexandria Auguste RN  Outcome: Progressing  6/19/2024 2128 by Alexandria Auguste RN  Outcome: Progressing  Goal: Free from acute confusion related to pain meds throughout the shift  6/19/2024 2128 by Alexandria Auguste RN  Outcome: Progressing  6/19/2024 2128 by Alexandria Auguste RN  Outcome: Progressing

## 2024-06-20 NOTE — NURSING NOTE
Assumed care of pt.  Pt awake, alert, no respiratory distress on room air.  Pt currently lying quietly in bed. Call light and bedside table within reach.  Will continue to monitor pt.

## 2024-06-20 NOTE — DISCHARGE SUMMARY
Discharge Diagnosis  Thoracic outlet syndrome    Issues Requiring Follow-Up  Postop incision check  Physical therapy, to be authorized at 2-week appointment    Test Results Pending At Discharge  Pending Labs       Order Current Status    Surgical Pathology Exam In process            Hospital Course   Pleasant 29-year-old  male who was admitted to the hospital on June 19, 2024 for elective right first rib resection secondary to thoracic outlet syndrome.  Patient had symptoms of forearm and hand numbness along with weakness of the right arm with use.  The patient has had an uneventful postoperative course and was started on a course of Celebrex and Flexeril as needed for postoperative pain.  Of note, he had a chest x-ray immediately postoperatively which was negative for pneumothorax and noted an absence of the first rib on the right.  He was given appropriate discharge instructions and follow-up appointment.    Pertinent Physical Exam At Time of Discharge  Physical Exam    General: Pt is alert and oriented x 3. Pleasant, conversive  HEENT: Head is atraumatic, normocephalic.   Cardiac: Normal S1-S2.  Regular rate and rhythm.  No murmurs.  Respiratory: Lungs clear to auscultation.  No adventitious sounds.  Abdomen: Soft, nondistended, nontender.  Bowel sounds x4 quadrants.  Pulse exam: Palpable brachial and radial pulses bilaterall.  Lower extremities are warm and well-perfused.  Extremities: 1+ edema right upper extremity.  Extremities are warm to the touch and normal in color.  No open wounds or sores.  Neuro: Moves all extremities spontaneously.  No focal deficits.  Psych: Appropriate affect.  Answers questions appropriately.    Home Medications     Medication List      START taking these medications     celecoxib 100 mg capsule; Commonly known as: CeleBREX; Take 1 capsule   (100 mg) by mouth 2 times a day as needed for mild pain (1 - 3) for up to   10 days.   cyclobenzaprine 5 mg tablet; Commonly known as:  Flexeril; Take 1 tablet   (5 mg) by mouth every 8 hours if needed for muscle spasms.     STOP taking these medications     chlorhexidine 0.12 % solution; Commonly known as: Peridex   doxycycline 100 mg tablet; Commonly known as: Adoxa       Outpatient Follow-Up  No future appointments.    Angel Sims, APRN-CNP

## 2024-06-20 NOTE — NURSING NOTE
Pt A&O x3 currently resting in bed. No complaints or concerns. Call light within reach. Dsg dry and intact to rt upper chest.

## 2024-06-20 NOTE — PROGRESS NOTES
Physical Therapy                 Therapy Communication Note    Patient Name: Papa Linares  MRN: [id number]  Today's Date: 6/20/2024     Discipline: Physical Therapy    Missed Visit Reason: Missed Visit Reason: Other (Comment) (PT Screen completed.)    PT SCREEN    PT consult received, chart reviewed. Spoke with patient. Pt is independent with all functional mobility. Pt with limited ROM R UE due to surgery and prior injuries. Pt educated on light activity to help with R UE strength, and wall-walking exercise for stretching. Pt and family verbalized understanding.  Formal evaluation deferred.

## 2024-06-20 NOTE — CARE PLAN
The patient's goals for the shift include Walk    The clinical goals for the shift include pain management, safety

## 2024-06-20 NOTE — DISCHARGE INSTRUCTIONS
Dressings  o Remove the dressings on day 3 after discharge.    Showering  o You may shower being gentle with the wound.  o Be careful not to stretch or stress or probe the wound.  o After showering, gently pat the wound dry - do not rub with  a towel as this may disturb the sutures.    Exercise  o When discharged, you should rest at home for the first days.  o Activities of normal daily living are permitted.  o Steadily increase you activities including.  o Avoid moderate lifting or effort for 3 months.  o Avoid Heavy lifting and strenuous upper limb exercise  indefinitely until you are authorized by vascular provider  o Avoid activities which place you arm above your head for  more than 1 minute until you are authorized by vascular provider     Driving  o It is important that you are unencumbered by pain, dressings  or weakness.  o You should not drive a car until cleared by a vascular provider  o You should not drive a car while on pain relief medications  or sedatives.    Anticipated post procedure course.  o It is normal to experience some firmness, tenderness,  bruising and mild swelling in the areas of the incision.    Medications  o Take your blood thinning medications until otherwise  instructed - usually at least 3 months.  o Continue to take your normal prescribed medication.  o You may have been prescribed potent analgesic  medications to take in the event of significant post-operative  pain to be used only as required.

## 2024-06-21 LAB
LABORATORY COMMENT REPORT: NORMAL
PATH REPORT.FINAL DX SPEC: NORMAL
PATH REPORT.GROSS SPEC: NORMAL
PATH REPORT.RELEVANT HX SPEC: NORMAL
PATH REPORT.TOTAL CANCER: NORMAL

## 2024-07-08 ENCOUNTER — OFFICE VISIT (OUTPATIENT)
Dept: VASCULAR SURGERY | Facility: CLINIC | Age: 29
End: 2024-07-08
Payer: COMMERCIAL

## 2024-07-08 VITALS — SYSTOLIC BLOOD PRESSURE: 127 MMHG | DIASTOLIC BLOOD PRESSURE: 84 MMHG | RESPIRATION RATE: 18 BRPM | HEART RATE: 67 BPM

## 2024-07-08 DIAGNOSIS — G54.0 THORACIC OUTLET SYNDROME: Primary | ICD-10-CM

## 2024-07-08 PROCEDURE — 99211 OFF/OP EST MAY X REQ PHY/QHP: CPT | Performed by: NURSE PRACTITIONER

## 2024-07-08 PROCEDURE — 1036F TOBACCO NON-USER: CPT | Performed by: NURSE PRACTITIONER

## 2024-07-08 ASSESSMENT — LIFESTYLE VARIABLES
HOW OFTEN DO YOU HAVE SIX OR MORE DRINKS ON ONE OCCASION: NEVER
AUDIT-C TOTAL SCORE: 0
HOW MANY STANDARD DRINKS CONTAINING ALCOHOL DO YOU HAVE ON A TYPICAL DAY: PATIENT DOES NOT DRINK
SKIP TO QUESTIONS 9-10: 1
HOW OFTEN DO YOU HAVE A DRINK CONTAINING ALCOHOL: NEVER

## 2024-07-08 ASSESSMENT — ENCOUNTER SYMPTOMS
OCCASIONAL FEELINGS OF UNSTEADINESS: 0
LOSS OF SENSATION IN FEET: 0
DEPRESSION: 0

## 2024-07-08 ASSESSMENT — PAIN SCALES - GENERAL: PAINLEVEL: 0-NO PAIN

## 2024-07-08 ASSESSMENT — PATIENT HEALTH QUESTIONNAIRE - PHQ9
2. FEELING DOWN, DEPRESSED OR HOPELESS: NOT AT ALL
SUM OF ALL RESPONSES TO PHQ9 QUESTIONS 1 AND 2: 0
1. LITTLE INTEREST OR PLEASURE IN DOING THINGS: NOT AT ALL

## 2024-07-08 NOTE — PROGRESS NOTES
History Of Present Illness  Papa Linares is a 29 y.o. male presenting  for follow-up visit status post elective right first rib resection secondary to thoracic outlet syndrome.  Patient notes that his symptoms of hand numbness and weakness in on the right have resolved.  Although, he notes he has not done much significant activity since surgery.  Prior to surgery, he was having hand numbness with simple tasks like washing the dishes.  Patient notes his incisions are healing well and he has not taking any muscle relaxant for at least a week.  He finished a course of Celebrex postoperatively.     Past Medical History  He has no past medical history on file.    Surgical History  He has no past surgical history on file.     Social History  He reports that he has never smoked. He has never used smokeless tobacco. He reports that he does not currently use alcohol. He reports that he does not use drugs.    Family History  No family history on file.     Allergies  Patient has no known allergies.    Review of Systems    CONSTITUTIONAL: Denies weight loss, fever and chills.    HEENT: Denies changes in vision and hearing.    RESPIRATORY: Denies SOB and cough.    CV: Denies palpitations and CP.    GI: Denies abdominal pain, nausea, vomiting and diarrhea.    : Denies dysuria and urinary frequency.    MSK: Denies myalgia and joint pain.  Positive for torn labrum.    SKIN: Denies rash and pruritus.    VASC: Denies claudication, ischemic rest pain, or open wounds or sores    NEUROLOGICAL: Denies headache and syncope.    PSYCHIATRIC: Denies recent changes in mood. Denies anxiety and depression.     Physical Exam    General: Pt is alert and oriented x 3. Pleasant, conversive  HEENT: Head is atraumatic, normocephalic.   Cardiac: Normal S1-S2.  Regular rate and rhythm.  No murmurs.  Right chest/neck incisions are well-approximated.  No surrounding tissue erythema or induration.  Pulse exam: Palpable brachial and radial pulses  bilaterally.  Lower extremities are warm and well-perfused.  Extremities: No edema right upper extremity.  Extremities are warm to the touch and normal in color.  No open wounds or sores.  Neuro: Moves all extremities spontaneously.  No focal deficits.  Psych: Appropriate affect.  Answers questions appropriately.     Last Recorded Vitals  /84   Pulse 67   Resp 18     Relevant Results    Current Outpatient Medications   Medication Instructions    cyclobenzaprine (FLEXERIL) 5 mg, oral, Every 8 hours PRN          Assessment/Plan   Thoracic outlet syndrome    Patient here for a postoperative visit today.  Both incisions look well-approximated with no evidence of postoperative infection.  The patient states that he has a started doing some physical therapy exercises at home and notes progress, particularly in range of motion.  Complicated by the fact that he has a torn labrum on the right as well.  He is due to see his orthopedist in the next week or 2.  I recommend keeping that appointment and following up with physical therapy if orthopedic surgeon is agreeable.       Angel Sims, APRN-CNP

## 2024-07-08 NOTE — PATIENT INSTRUCTIONS
Papa,    It was great to see you again!  Thank you for choosing  vascular surgery for your care.  You are doing excellently after your first rib removal due to thoracic outlet syndrome.  Your incisions are well-healed and do not appear infected.  You also reported that your numbness in your right hand and arm has improved based on the minimal activity that you have completed thus far.    Please keep your appointment with your orthopedic surgeon for further evaluation of your torn labrum.  Based on their input, we will refer you to physical therapy.

## 2024-07-15 ENCOUNTER — OFFICE VISIT (OUTPATIENT)
Dept: ORTHOPEDIC SURGERY | Facility: CLINIC | Age: 29
End: 2024-07-15
Payer: COMMERCIAL

## 2024-07-15 DIAGNOSIS — S43.431D LABRAL TEAR OF SHOULDER, RIGHT, SUBSEQUENT ENCOUNTER: ICD-10-CM

## 2024-07-15 PROCEDURE — 2500000004 HC RX 250 GENERAL PHARMACY W/ HCPCS (ALT 636 FOR OP/ED): Performed by: STUDENT IN AN ORGANIZED HEALTH CARE EDUCATION/TRAINING PROGRAM

## 2024-07-15 PROCEDURE — 2500000005 HC RX 250 GENERAL PHARMACY W/O HCPCS: Performed by: STUDENT IN AN ORGANIZED HEALTH CARE EDUCATION/TRAINING PROGRAM

## 2024-07-15 PROCEDURE — 99214 OFFICE O/P EST MOD 30 MIN: CPT | Performed by: STUDENT IN AN ORGANIZED HEALTH CARE EDUCATION/TRAINING PROGRAM

## 2024-07-15 PROCEDURE — 20610 DRAIN/INJ JOINT/BURSA W/O US: CPT | Mod: RT | Performed by: STUDENT IN AN ORGANIZED HEALTH CARE EDUCATION/TRAINING PROGRAM

## 2024-07-15 RX ORDER — LIDOCAINE HYDROCHLORIDE 10 MG/ML
4 INJECTION INFILTRATION; PERINEURAL
Status: COMPLETED | OUTPATIENT
Start: 2024-07-15 | End: 2024-07-15

## 2024-07-15 RX ORDER — TRIAMCINOLONE ACETONIDE 40 MG/ML
40 INJECTION, SUSPENSION INTRA-ARTICULAR; INTRAMUSCULAR
Status: COMPLETED | OUTPATIENT
Start: 2024-07-15 | End: 2024-07-15

## 2024-07-15 NOTE — PROGRESS NOTES
Chief Complaint   Patient presents with    Right Shoulder - Follow-up     Posterior labral tear, recently had excision of rib surgery on 6/19/24.  Would like to discuss shoulder surgery today       HPI  29-year-old male presents today for evaluation of his right shoulder.  Patient with known history of labrum tear as diagnosed by MRI shoulder arthrogram 8/28/2023.  Patient was having some neurovascular type symptoms about his arm therefore I did recommend that he get evaluated for thoracic outlet syndrome.  This was positive and patient did have recent elective first rib resection due to thoracic outlet syndrome.  Regarding right shoulder pain.  Patient has attempted anti-inflammatories with little to no relief.  Thoracic outlet surgery did help him with quite a bit of the numbness that he was having still quite a bit sore from recent surgery.    No past medical history on file.    No past surgical history on file.     No Known Allergies     Physical exam    General: Alert and oriented to place, person, and time.  No acute distress and breathing comfortably; pleasant and cooperative with the examination.  HEENT: Head is normocephalic and atraumatic.  Neck: Supple, no visible swelling.  Cardiovascular: Good perfusion to the affected extremity.  Lungs: No audible wheezing or labored breathing.  Abdomen: Nondistended  HEME/Lymph : No visible abnormalities bilateral lower extremity    Extremity:  Right shoulder:    No generalized ligamentous laxity  Skin healthy to gross inspection  Mild swelling, no gross atrophy  Negative sulcus sign    + Tenderness over the anterior glenohumeral joint line  No tenderness to palpation over acromioclavicular joint  No tenderness to palpation over biceps tendon  Tolerates forward flexion to 90  + Apprehension at side   + Relocation test  Rotator cuff strength:  Exam limited but grossly intact    Load shift test: to rim of the glenoid   Sensation intact over deltoid  Neurovascular  exam normal distally     Diagnostics:  INDICATION:  pain  G54.0: TOS (thoracic outlet syndrome) S43.439A: Labral tear of  shoulder. Chronic right shoulder pain and instability. Limited range  of motion. Worsening pain and numbness. No previous surgery.     COMPARISON:  Plain film examination of 08/09/2011.     ACCESSION NUMBER(S):  [id number]     ORDERING CLINICIAN:  SOURAV FELIX     TECHNIQUE:  Multiplanar and multisequential MR images of the right shoulder  performed following the uneventful intra-articular administration of  sterile saline and gadolinium.     FINDINGS:  There is adequate capsular distention. There is no gadolinium signal  or fluid accumulation in the subacromial/subdeltoid bursa.     There is no widening of the acromioclavicular joint space. The  coracoclavicular ligament is grossly intact. There is no sign of  acute osseous fracture, bone marrow edema, or osseous mass.     There is mild fluid distention of the biceps tendon sheath. The  extra-articular long head biceps tendon is intact and normally  positioned. The intra-articular tendon is intact to level the biceps  anchor.     There is linear high T1 weighted T2 weighted signal at the base of  the labrum and extending to the chondrolabral junction from the 3  o'clock to 7 o'clock position. The superior and middle glenohumeral  ligaments are intact. The axillary recess has a patulous appearance  with some contour irregularity towards the humeral attachment  posteriorly. This could reflect a site of previous ligament  disruption with residual deformity and scarring.     The supraspinatus, infraspinatus, subscapularis, and teres minor  tendons are intact. There is no overt muscle atrophy. There is no  muscle edema.     The surrounding soft tissues are unremarkable.     IMPRESSION:  Suspected chondrolabral separation and labral tearing from the 3  o'clock to 7 o'clock position.     Patulous axillary recess with thinning and irregularity of  the  inferior glenohumeral ligament towards the humeral attachment  posteriorly. This could reflect a site of previous tear.     No sign of rotator cuff tendon tear.     The long head biceps tendon is intact and normally positioned.     No acute osseous abnormality.    Procedure:  L Inj/Asp: R glenohumeral on 7/15/2024 1:16 PM  Indications: pain  Details: 22 G needle, posterior approach  Medications: 40 mg triamcinolone acetonide 40 mg/mL; 4 mL lidocaine 10 mg/mL (1 %)  Consent was given by the patient. Immediately prior to procedure a time out was called to verify the correct patient, procedure, equipment, support staff and site/side marked as required. Patient was prepped and draped in the usual sterile fashion.           Assessment:  29-year-old male with known anterior-inferior labrum tear right shoulder, thoracic outlet syndrome recent first rib resection    Treatment plan:  The natural history of the condition and its associated treatment alternatives including surgical and nonsurgical options were discussed with the patient at length.  Will proceed with a intra-articular injection today discussed that this is both diagnostic and therapeutic.  Will also provide a prescription for physical therapy  Will have him follow-up in 6 weeks time  Discussed activities to avoid as well as importance of using pain as a guide  I discussed risks and benefits of corticosteroid injection and the patient would like to proceed.  Discussed risks of skin depigmentation and the rare but possible intravascular injection of local anesthetic which can cause palpitations or arrhythmias. I discussed the possibility of a transient increase in blood sugar. I explained that the local anesthetic tends to work immediately, it may take 2-3 days for the full effect of the corticosteroid compound. Consent was obtained and side confirmed by the patient.      All of the patient's questions were answered.

## 2024-08-05 ENCOUNTER — EVALUATION (OUTPATIENT)
Dept: PHYSICAL THERAPY | Facility: CLINIC | Age: 29
End: 2024-08-05
Payer: COMMERCIAL

## 2024-08-05 DIAGNOSIS — S43.431D LABRAL TEAR OF SHOULDER, RIGHT, SUBSEQUENT ENCOUNTER: ICD-10-CM

## 2024-08-05 DIAGNOSIS — G54.0 THORACIC OUTLET SYNDROME: Primary | ICD-10-CM

## 2024-08-05 PROCEDURE — 97110 THERAPEUTIC EXERCISES: CPT | Mod: GP | Performed by: PHYSICAL THERAPIST

## 2024-08-05 PROCEDURE — 97162 PT EVAL MOD COMPLEX 30 MIN: CPT | Mod: GP | Performed by: PHYSICAL THERAPIST

## 2024-08-05 ASSESSMENT — PATIENT HEALTH QUESTIONNAIRE - PHQ9
1. LITTLE INTEREST OR PLEASURE IN DOING THINGS: NOT AT ALL
SUM OF ALL RESPONSES TO PHQ9 QUESTIONS 1 AND 2: 0
2. FEELING DOWN, DEPRESSED OR HOPELESS: NOT AT ALL

## 2024-08-05 NOTE — PROGRESS NOTES
Physical Therapy Evaluation and Treatment      Patient Name: Papa Linares  MRN: [id number]  Today's Date: 8/5/2024  Time Calculation  Start Time: 0922  Stop Time: 1011  Time Calculation (min): 49 min      PT Evaluation Time Entry  PT Evaluation (Moderate) Time Entry: 25  PT Therapeutic Procedures Time Entry  Therapeutic Exercise Time Entry: 20                   INSURANCE:  Visit number: 1/8  [facility] CHOICE PLUS 60V PT OT COPAY 0 (MET)  1400(700) COVERAGE 100 OOP [phone]) [phone](3588)  NO AUTH REQ REF# [id number]  [id number]/ALL    Surgery: 6/19/24 TOS surgery (1st rib excision; tenotomy anterior/middle scalenes and pec minor) by Sofia Layton MD   Non-Surgical: R Shoulder Labral Tear  Referring Provider: Dr. Cristobal Ogden III    ASSESSMENT:  PT Assessment Results: decreased knowledge of HEP, activity limitations, ADLs/IADLs/self care skills, flexibility, motor function/control/tone, pain, participation restrictions, range of motion/joint mobility, strength, posture, transfers, coordination, integumentary, decreased knowledge of precautions.  Rehab Prognosis: Good  Evaluation/Treatment Tolerance: Patient tolerated treatment well    Papa Linares is a 29 y.o. male presenting to the clinic with a R labral tear. Pt also had recent TOS surgery with 1st rib excision and tenotomy anterior/middle scalenes and pec minor by Dr. Sofia Layton. Pt demonstrates decreased ROM and strength of B shoulders and cervicothoracic spine causing pain and dysfunction with overhead/behind back reaching, pushing, pulling, dressing, bathing, lifting, and cleaning. Pt was given and reviewed HEP including postural exercises. Pt will benefit from skilled PT in order to increase ROM and strength of the B shoulders and cervicothoracic spine so that the pt can perform ADLs without pain and return to PLOF.     PLAN:  Treatments/Interventions: traction, dry needling, edema control, education/instruction, home program, self care/home  management, manual therapy, neuromuscular re-education, therapeutic activities, therapeutic exercises, modalities, therapeutic elastic taping.   PT Plan: Skilled PT  Duration: 1-2 times a week for 8 visits  Onset Date: 08/05/09  Rehab Potential: Good  Plan of Care Agreement: Patient    Goals -    By discharge pt will achieve the following goals:   Pt will report less than a 2/10 pain at the worst.  Pt will report a significant improvement with Quick DASH score.  Pt will have at least 4+/5 strength for the bilateral shoulders.  Pt will have AROM to WFL for the bilateral shoulders and cervical spine.  Pt will be independent with HEP.    CURRENT PROBLEM:   1. Thoracic outlet syndrome        2. Labral tear of shoulder, right, subsequent encounter  Referral to Physical Therapy    Follow Up In Physical Therapy          Subjective    General -    Pt reports that he has a R shoulder labral tear that occurred about 15 years ago. Pt states that he also started getting TOS around that time in [facility]. Pt had surgery on 6/19/24 for TOS surgery (1st rib excision; tenotomy anterior/middle scalenes and pec minor) by Sofia Layton MD. Thinking about getting a R labral surgery, is scheduled to see Dr. Ogden on the 23rd but needs to heal and get stronger from the TOS surgery. Pt is R handed, so that is why they did surgery on the R UE, but gets TOS in the L UE as well. Pt reports that know that the TOS is improved on the R side, he can now feel how painful the labral tear is. Pt also gets sharp pain in R scapula with winging.    Mechanism of Injury -    Insidious Onset    History of Current Episode - 8/5/2009    Pain -    Pain Location: R shoulder neck  Pain Best: 4-5/10  Pain Today: 7-8/10  Pain Worst: 9/10   Pain Type: Constant, Achy, sometimes Sharp, Unstable, Numbness/Tingling (L UE)    Pain Exacerbating Factors: overhead/behind back reaching, pushing, pulling, dressing, bathing, lifting, and cleaning.  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    Patient Stated Goals -    Help with mobility  Reduce pain    PRECAUTIONS -    None    Prior Level of Function -    I with ADLs/IADLs     Objective     Cervical AROM (degrees) -  Cervical Flexion: 50 (L arm numbness)   Cervical Extension: 60  R Cervical Side Bend: 32   L Cervical Side Bend: 30  R Cervical Rotation: 52  L Cervical Rotation: 58     Shoulder AROM (degrees) - (pain with both shoulders for all motions R>L)   R shoulder Flexion: 100   R shoulder Abduction: 110   R shoulder ER: 35   R shoulder Functional IR reach: T11  L shoulder Flexion: 120   L shoulder Abduction: 128   L shoulder ER: 58   L shoulder Functional IR reach: T8     Shoulder MMT (/5) - (pain with all shoulder strength testing R>L)  R shoulder Flexion: 4  R shoulder Abduction: 4   R shoulder ER: 4   R shoulder IR: 4+   R Middle trapezius: 4   R Lower trapezius: 4-  L shoulder Flexion: 4+   L shoulder Abduction: 4+   L shoulder ER: 4+   L shoulder IR: 4+   L Middle trapezius: 4  L Lower trapezius: 4    Posture -   Slight Rounded Shoulders  Slight Forward Head  B scapular Winging R>L    Outcome Measures -   Other Measures  Disability of Arm Shoulder Hand (DASH): 25 (% Quick)     Treatment -   Evaluation -   Moderate (36928)  Therapeutic Exercise (53596) -    Seated Correct Posture   Scapular Retraction  Cervical Retractions   UT/Levator Scapulae/Scalene Stretches: 30 sec ea   SA slides wall: x5  1st rib self-mobilization on L side only: x10  Education on Scar Massage: Up/Down, Side/Side, Circles for 5 min a day using Bio-Oil, Vitamin E oil, or lotion    OP Patient Education -   Access Code: [email or web address]  URL:  [email or web address]  Date: 08/05/2024  Prepared by: Mariela Fox    Exercises  - Seated Correct Posture   - Seated Scapular Retraction  - 1-2 x daily - 2 sets - 10 reps - 2 seconds hold  - Seated Cervical Retraction  - 1-2 x daily - 2 sets - 10 reps - 2 seconds hold  - Supine Cervical Retraction with Towel  - 1-2 x daily - 2 sets - 10 reps - 2 seconds hold  - Seated Upper Trapezius Stretch  - 1-2 x daily - 3 sets - 30 seconds hold  - Gentle Levator Scapulae Stretch  - 1-2 x daily - 3 sets - 30 seconds hold  - Seated Scalene Stretch with Towel  - 1-2 x daily - 3 sets - 30 seconds hold  - Serratus Activation at Wall  - 1-2 x daily - 7 x weekly - 2 sets - 10 reps    HEP 2 Go: 1st rib mobilization on L side only  Education on Scar Massage: Up/Down, Side/Side, Circles for 5 min a day using Bio-Oil, Vitamin E oil, or lotion

## 2024-08-22 NOTE — PROGRESS NOTES
"Physical Therapy Treatment    Patient Name: Papa Linares  MRN: [id number]  Today's Date: 8/26/2024  Time Calculation  Start Time: 0750  Stop Time: 0832  Time Calculation (min): 42 min     PT Therapeutic Procedures Time Entry  Manual Therapy Time Entry: 17  Therapeutic Exercise Time Entry: 23                 Current Problem  1. Labral tear of shoulder, right, subsequent encounter  Follow Up In Physical Therapy      2. Thoracic outlet syndrome            Insurance:  Visit number: 2/8  [facility] CHOICE PLUS 60V PT OT COPAY 0 (MET)  1400(700) COVERAGE 100 OOP [phone]) [phone](3588)  NO AUTH REQ REF# [id number]  [id number]/ALL  Precautions   none   6/19/24 TOS surgery (1st rib excision; tenotomy anterior/middle scalenes and pec minor) by Sofia Layton MD   Non-Surgical: R Shoulder Labral Tear,  Dr. Cristobal Ogden III     Subjective   Subjective:   Pt states he is supposed to get surgery scheduled for his labrum. Pt has pain in most motions. Once in a while has pain at rest.   Pain   5-6/10    Objective   Treatments:  Therapeutic Exercise (15292) -    Seated Correct Posture ----  Scapular Retraction----  Cervical Retractions ----  UT/Levator Scapulae/Scalene Stretches: 30 sec 3  (UT only today)  SA slides wall: x10  Doorway pec stretch 30\"x3  Shoulder row/ext RTB 20x  Shoulder ER/IR iso into wall 10\"x5  1st rib self-mobilization on L side only: x10----  Education on Scar Massage: Up/Down, Side/Side, Circles for 5 min a day using Bio-Oil, Vitamin E oil, or lotion----    PROM, STM to UT/pec      OP EDUCATION:   Current HEP      Assessment:   Pt displays fairly good PROM. He had some palpable tightness in the R UT and pecs. Pt had difficulty achieving full motion with SA slides. Added scap retraction ex with rows and ext. Good form displayed. Tolerated treatment session well.    Plan:   May try dry needling at next visit.               "

## 2024-08-23 ENCOUNTER — OFFICE VISIT (OUTPATIENT)
Dept: ORTHOPEDIC SURGERY | Facility: CLINIC | Age: 29
End: 2024-08-23
Payer: COMMERCIAL

## 2024-08-23 ENCOUNTER — TELEPHONE (OUTPATIENT)
Dept: ORTHOPEDIC SURGERY | Facility: CLINIC | Age: 29
End: 2024-08-23

## 2024-08-23 DIAGNOSIS — S43.431S GLENOID LABRUM TEAR, RIGHT, SEQUELA: ICD-10-CM

## 2024-08-23 PROCEDURE — 99214 OFFICE O/P EST MOD 30 MIN: CPT | Mod: 57 | Performed by: STUDENT IN AN ORGANIZED HEALTH CARE EDUCATION/TRAINING PROGRAM

## 2024-08-23 PROCEDURE — L3670 SO ACRO/CLAV CAN WEB PRE OTS: HCPCS | Performed by: STUDENT IN AN ORGANIZED HEALTH CARE EDUCATION/TRAINING PROGRAM

## 2024-08-23 RX ORDER — OXYCODONE AND ACETAMINOPHEN 5; 325 MG/1; MG/1
1 TABLET ORAL EVERY 8 HOURS PRN
Qty: 28 TABLET | Refills: 0 | Status: SHIPPED | OUTPATIENT
Start: 2024-08-23

## 2024-08-23 NOTE — TELEPHONE ENCOUNTER
Called pharmacy back, spoke with pharmacist, he wanted to clarify the quantity, the insurance will only pay for a week so that would be 21 tablets, I said that was fine.

## 2024-08-23 NOTE — TELEPHONE ENCOUNTER
[facility] called asking fro clarification on the medication called in. Would like a call back at [phone]

## 2024-08-23 NOTE — PROGRESS NOTES
Chief Complaint   Patient presents with    Right Shoulder - Follow-up     Posterior labral tear, excision of rib surgery 6/19/24.  S/P: cortisone injection 7/15/24       HPI  29-year-old male presents today for evaluation of his right shoulder.  Patient with known history of labrum tear as diagnosed by MRI shoulder arthrogram 8/28/2023.  Patient was having some neurovascular type symptoms about his arm therefore I did recommend that he get evaluated for thoracic outlet syndrome.  This was positive and patient did have recent elective first rib resection due to thoracic outlet syndrome.  Regarding right shoulder pain.  Patient has attempted anti-inflammatories with little to no relief.  Thoracic outlet surgery did help him with quite a bit of the numbness that he was having still quite a bit sore from recent surgery.    Patient continues have clicking catching popping about his shoulder unable to do shoulder press activities.  Activities above his head really bother him.  This has been present for a long time.  He has attempted activity modifications oral anti-inflammatories intra-articular injections with little to no relief tired of feeling this way wishes to proceed with surgical treatment.    No past medical history on file.    No past surgical history on file.     No Known Allergies     Physical exam    General: Alert and oriented to place, person, and time.  No acute distress and breathing comfortably; pleasant and cooperative with the examination.  HEENT: Head is normocephalic and atraumatic.  Neck: Supple, no visible swelling.  Cardiovascular: Good perfusion to the affected extremity.  Lungs: No audible wheezing or labored breathing.  Abdomen: Nondistended  HEME/Lymph : No visible abnormalities bilateral lower extremity    Extremity:  Right shoulder:    No generalized ligamentous laxity  Skin healthy to gross inspection  Mild swelling, no gross atrophy  Negative sulcus sign    + Tenderness over the  anterior glenohumeral joint line  No tenderness to palpation over acromioclavicular joint  No tenderness to palpation over biceps tendon  Tolerates forward flexion to 90  + Apprehension at side   + Relocation test  Rotator cuff strength:  Exam limited but grossly intact    Load shift test: to rim of the glenoid   Sensation intact over deltoid  Neurovascular exam normal distally     Diagnostics:  INDICATION:  pain  G54.0: TOS (thoracic outlet syndrome) S43.439A: Labral tear of  shoulder. Chronic right shoulder pain and instability. Limited range  of motion. Worsening pain and numbness. No previous surgery.     COMPARISON:  Plain film examination of 08/09/2011.     ACCESSION NUMBER(S):  [id number]     ORDERING CLINICIAN:  SOURAV FELIX     TECHNIQUE:  Multiplanar and multisequential MR images of the right shoulder  performed following the uneventful intra-articular administration of  sterile saline and gadolinium.     FINDINGS:  There is adequate capsular distention. There is no gadolinium signal  or fluid accumulation in the subacromial/subdeltoid bursa.     There is no widening of the acromioclavicular joint space. The  coracoclavicular ligament is grossly intact. There is no sign of  acute osseous fracture, bone marrow edema, or osseous mass.     There is mild fluid distention of the biceps tendon sheath. The  extra-articular long head biceps tendon is intact and normally  positioned. The intra-articular tendon is intact to level the biceps  anchor.     There is linear high T1 weighted T2 weighted signal at the base of  the labrum and extending to the chondrolabral junction from the 3  o'clock to 7 o'clock position. The superior and middle glenohumeral  ligaments are intact. The axillary recess has a patulous appearance  with some contour irregularity towards the humeral attachment  posteriorly. This could reflect a site of previous ligament  disruption with residual deformity and scarring.     The supraspinatus,  infraspinatus, subscapularis, and teres minor  tendons are intact. There is no overt muscle atrophy. There is no  muscle edema.     The surrounding soft tissues are unremarkable.     IMPRESSION:  Suspected chondrolabral separation and labral tearing from the 3  o'clock to 7 o'clock position.     Patulous axillary recess with thinning and irregularity of the  inferior glenohumeral ligament towards the humeral attachment  posteriorly. This could reflect a site of previous tear.     No sign of rotator cuff tendon tear.     The long head biceps tendon is intact and normally positioned.     No acute osseous abnormality.    Procedure:  Procedures    Assessment:  29-year-old male with known anterior-inferior labrum tear right shoulder, thoracic outlet syndrome recent first rib resection    Treatment plan:  The natural history of the condition and its associated treatment alternatives including surgical and nonsurgical options were discussed with the patient at length.    Intra-articular injection provided very short course of relief.  Discussed with patient multiple forms of conservative treatment as well as operative treatment options.  Using shared inform decision making patient wished to proceed with surgical treatment to include labrum repair.  Discussed that this may provide him some relief regarding his shoulder however may not provide 100% relief given his thoracic outlet syndrome findings status post surgical treatment.  Risk benefits alternative treatment discussed using shared informed tissue making wishes to proceed.    Based on history and physical exam as well as imaging findings recommend surgical intervention to include right shoulder diagnostic arthoscopy, debridement, labrum repair.  Risk benefits and alternatives to treatment were discussed.  Particular risks of the surgery include continued pain, weakness,  stiffness, neurovascular compromise damage to axillary nerve, continued pain.  Despite these  risks, patient wishes to proceed.  Operative benefits include potential restoration of function/anatomy, Postoperative restrictions and limitations were reviewed in detail.  Patient will schedule surgery at their earliest convenience.    The planned surgical procedure includes examination under anesthesia. Anesthetic risks will be discussed with the patient by the anesthetist. Arthroscopic evaluation will be performed of the shoulder joint.  Then an arthroscopic procedure will be performed which may include but not be limited to debridement of structures in and around the shoulder joint, repair of rotator cuff or other indicated structures requiring repair.  Regarding the biceps tendon, I will evaluate this intraoperatively.  If there is significant fraying biceps tenosynovitis and visualized pathology not otherwise seen on MRI we will perform open biceps tenodesis versus biceps tenotomy.    Pending intraoperative findings and therapeutic treatment patient may be required to wear a sling for 6 weeks duration.  This was discussed with the patient and they are willing to follow postoperative restrictions.  We discussed the use of physical therapy after this immobilization.  To aid in gaining return of motion and function to the operative extremity.     In addition, if there are advanced degenerative changes I have advised the patient that this may indeed be a progressive process, ultimately resulting in further pain at some point in the future.    Patient was given Percocet for postoperative pain control.  We will pick this up prior to surgery so that they are not looking for during the day of surgery. I have personally reviewed the OARRS report for this patient. This report is scanned into  the electronic medical record. I have considered the risks of abuse, dependence, addiction, and diversion. They currently report a pain of 8.    Regarding DVT prophylaxis, recommend generalized ambulation    The risks of surgery  were discussed including but not limited to the risks of medications given for surgery, the risk of blood loss during and after surgery that can lead to the need for blood products in certain situations, infection, damage to normal structures that can lead to long term problems of pain or dysfunction, wound healing complications, the possibility of nonunion/malunion of any osteotomies and late or chronic pain as a result of the surgical intervention.  In addition potentially life threatening complications that can occur at the time of surgery and after surgery were discussed including but not limited to deep vein thrombosis, pulmonary embolism, myocardial infarction, stroke and death.

## 2024-08-26 ENCOUNTER — TRANSCRIBE ORDERS (OUTPATIENT)
Dept: PREADMISSION TESTING | Facility: HOSPITAL | Age: 29
End: 2024-08-26

## 2024-08-26 ENCOUNTER — TREATMENT (OUTPATIENT)
Dept: PHYSICAL THERAPY | Facility: CLINIC | Age: 29
End: 2024-08-26
Payer: COMMERCIAL

## 2024-08-26 ENCOUNTER — TELEPHONE (OUTPATIENT)
Dept: ORTHOPEDIC SURGERY | Facility: CLINIC | Age: 29
End: 2024-08-26

## 2024-08-26 DIAGNOSIS — G54.0 THORACIC OUTLET SYNDROME: ICD-10-CM

## 2024-08-26 DIAGNOSIS — M75.22 BICIPITAL TENDINITIS, LEFT SHOULDER: ICD-10-CM

## 2024-08-26 DIAGNOSIS — S43.431D LABRAL TEAR OF SHOULDER, RIGHT, SUBSEQUENT ENCOUNTER: Primary | ICD-10-CM

## 2024-08-26 DIAGNOSIS — M75.102 UNSPECIFIED ROTATOR CUFF TEAR OR RUPTURE OF LEFT SHOULDER, NOT SPECIFIED AS TRAUMATIC: Primary | ICD-10-CM

## 2024-08-26 PROCEDURE — 97140 MANUAL THERAPY 1/> REGIONS: CPT | Mod: GP,CQ

## 2024-08-26 PROCEDURE — 97110 THERAPEUTIC EXERCISES: CPT | Mod: GP,CQ

## 2024-08-26 NOTE — TELEPHONE ENCOUNTER
"   Recent Vitals     6/20/2024  0802 6/20/2024  1127 7/8/2024  1257 Most Recent Value   Weight: -- -- -- 76.5 kg (168 lb 10.4 oz)  as of 6/20/2024   Body Mass Index:    28.07 kg/m² Abnormal   1.651 m (5' 5\")  as of 6/19/2024  76.5 kg (168 lb 10.4 oz)  as of 6/20/2024   BP: 117/65 133/46 Abnormal  127/84 127/84  as of 7/8/2024   Heart Rate: 61 -- 67 67  as of 7/8/2024   Resp: 20 16 18 18  as of 7/8/2024   Temp: 36.5 °C (97.7 °F) 37.1 °C (98.8 °F) -- 37.1 °C (98.8 °F)  as of 6/20/2024   SpO2: 96 % 94 % -- 94%  as of 6/20/2024   Height: -- -- -- 1.651 m (5' 5\")  as of 6/19/2024   Peak Flow: -- -- -- Not taken   Body Surface Area:    1.87 m²  1.651 m (5' 5\")  as of 6/19/2024  76.5 kg (168 lb 10.4 oz)  as of 6/20/2024   Adjusted Weight:    67.5 kg (148 lb 13 oz)  Actual Weight:  76.5 kg (168 lb 10.4 oz)  as of 6/20/2024  Ideal Weight:  61.5 kg (135 lb 9.3 oz)  as of 6/19/2024   Dosing Weight:    None     "

## 2024-09-09 ENCOUNTER — APPOINTMENT (OUTPATIENT)
Dept: PHYSICAL THERAPY | Facility: CLINIC | Age: 29
End: 2024-09-09
Payer: COMMERCIAL

## 2024-09-12 ENCOUNTER — LAB (OUTPATIENT)
Dept: LAB | Facility: LAB | Age: 29
End: 2024-09-12
Payer: COMMERCIAL

## 2024-09-12 DIAGNOSIS — M75.102 UNSPECIFIED ROTATOR CUFF TEAR OR RUPTURE OF LEFT SHOULDER, NOT SPECIFIED AS TRAUMATIC: ICD-10-CM

## 2024-09-12 DIAGNOSIS — M75.22 BICIPITAL TENDINITIS, LEFT SHOULDER: ICD-10-CM

## 2024-09-12 LAB
ALBUMIN SERPL BCP-MCNC: 4.7 G/DL (ref 3.4–5)
ALP SERPL-CCNC: 46 U/L (ref 33–120)
ALT SERPL W P-5'-P-CCNC: 14 U/L (ref 10–52)
ANION GAP SERPL CALC-SCNC: 10 MMOL/L (ref 10–20)
AST SERPL W P-5'-P-CCNC: 17 U/L (ref 9–39)
BASOPHILS # BLD AUTO: 0.04 X10*3/UL (ref 0–0.1)
BASOPHILS NFR BLD AUTO: 0.7 %
BILIRUB SERPL-MCNC: 0.5 MG/DL (ref 0–1.2)
BUN SERPL-MCNC: 18 MG/DL (ref 6–23)
CALCIUM SERPL-MCNC: 9.7 MG/DL (ref 8.6–10.3)
CHLORIDE SERPL-SCNC: 104 MMOL/L (ref 98–107)
CO2 SERPL-SCNC: 30 MMOL/L (ref 21–32)
CREAT SERPL-MCNC: 1.25 MG/DL (ref 0.5–1.3)
EGFRCR SERPLBLD CKD-EPI 2021: 79 ML/MIN/1.73M*2
EOSINOPHIL # BLD AUTO: 0.04 X10*3/UL (ref 0–0.7)
EOSINOPHIL NFR BLD AUTO: 0.7 %
ERYTHROCYTE [DISTWIDTH] IN BLOOD BY AUTOMATED COUNT: 12 % (ref 11.5–14.5)
GLUCOSE SERPL-MCNC: 77 MG/DL (ref 74–99)
HCT VFR BLD AUTO: 41.9 % (ref 41–52)
HGB BLD-MCNC: 14.2 G/DL (ref 13.5–17.5)
IMM GRANULOCYTES # BLD AUTO: 0.02 X10*3/UL (ref 0–0.7)
IMM GRANULOCYTES NFR BLD AUTO: 0.4 % (ref 0–0.9)
LYMPHOCYTES # BLD AUTO: 1.99 X10*3/UL (ref 1.2–4.8)
LYMPHOCYTES NFR BLD AUTO: 36.6 %
MCH RBC QN AUTO: 29.2 PG (ref 26–34)
MCHC RBC AUTO-ENTMCNC: 33.9 G/DL (ref 32–36)
MCV RBC AUTO: 86 FL (ref 80–100)
MONOCYTES # BLD AUTO: 0.54 X10*3/UL (ref 0.1–1)
MONOCYTES NFR BLD AUTO: 9.9 %
NEUTROPHILS # BLD AUTO: 2.81 X10*3/UL (ref 1.2–7.7)
NEUTROPHILS NFR BLD AUTO: 51.7 %
NRBC BLD-RTO: 0 /100 WBCS (ref 0–0)
PLATELET # BLD AUTO: 276 X10*3/UL (ref 150–450)
POTASSIUM SERPL-SCNC: 4.4 MMOL/L (ref 3.5–5.3)
PROT SERPL-MCNC: 7.1 G/DL (ref 6.4–8.2)
RBC # BLD AUTO: 4.86 X10*6/UL (ref 4.5–5.9)
SODIUM SERPL-SCNC: 140 MMOL/L (ref 136–145)
WBC # BLD AUTO: 5.4 X10*3/UL (ref 4.4–11.3)

## 2024-09-12 PROCEDURE — 85610 PROTHROMBIN TIME: CPT

## 2024-09-12 PROCEDURE — 36415 COLL VENOUS BLD VENIPUNCTURE: CPT

## 2024-09-12 PROCEDURE — 85730 THROMBOPLASTIN TIME PARTIAL: CPT

## 2024-09-12 PROCEDURE — 85025 COMPLETE CBC W/AUTO DIFF WBC: CPT

## 2024-09-12 PROCEDURE — 80053 COMPREHEN METABOLIC PANEL: CPT

## 2024-09-13 LAB
APTT PPP: 30 SECONDS (ref 27–38)
INR PPP: 1.1 (ref 0.9–1.1)
PROTHROMBIN TIME: 12.1 SECONDS (ref 9.8–12.8)

## 2024-09-16 ENCOUNTER — APPOINTMENT (OUTPATIENT)
Dept: PHYSICAL THERAPY | Facility: CLINIC | Age: 29
End: 2024-09-16
Payer: COMMERCIAL

## 2024-09-23 ENCOUNTER — APPOINTMENT (OUTPATIENT)
Dept: PHYSICAL THERAPY | Facility: CLINIC | Age: 29
End: 2024-09-23
Payer: COMMERCIAL

## 2024-09-24 ENCOUNTER — PHARMACY VISIT (OUTPATIENT)
Dept: PHARMACY | Facility: CLINIC | Age: 29
End: 2024-09-24
Payer: COMMERCIAL

## 2024-09-24 DIAGNOSIS — G89.18 POST-OPERATIVE PAIN: Primary | ICD-10-CM

## 2024-09-24 PROCEDURE — 29806 SHO ARTHRS SRG CAPSULORRAPHY: CPT | Performed by: FAMILY MEDICINE

## 2024-09-24 PROCEDURE — RXMED WILLOW AMBULATORY MEDICATION CHARGE

## 2024-09-24 PROCEDURE — 29823 SHO ARTHRS SRG XTNSV DBRDMT: CPT

## 2024-09-24 PROCEDURE — 29823 SHO ARTHRS SRG XTNSV DBRDMT: CPT | Performed by: FAMILY MEDICINE

## 2024-09-24 RX ORDER — OXYCODONE AND ACETAMINOPHEN 5; 325 MG/1; MG/1
1 TABLET ORAL EVERY 6 HOURS PRN
Qty: 21 TABLET | Refills: 0 | Status: SHIPPED | OUTPATIENT
Start: 2024-09-24 | End: 2024-10-01

## 2024-09-30 ENCOUNTER — APPOINTMENT (OUTPATIENT)
Dept: PHYSICAL THERAPY | Facility: CLINIC | Age: 29
End: 2024-09-30
Payer: COMMERCIAL

## 2024-10-07 ENCOUNTER — APPOINTMENT (OUTPATIENT)
Dept: PHYSICAL THERAPY | Facility: CLINIC | Age: 29
End: 2024-10-07
Payer: COMMERCIAL

## 2024-10-07 ENCOUNTER — APPOINTMENT (OUTPATIENT)
Dept: ORTHOPEDIC SURGERY | Facility: CLINIC | Age: 29
End: 2024-10-07
Payer: COMMERCIAL

## 2024-10-09 ENCOUNTER — OFFICE VISIT (OUTPATIENT)
Dept: ORTHOPEDIC SURGERY | Facility: CLINIC | Age: 29
End: 2024-10-09
Payer: COMMERCIAL

## 2024-10-09 DIAGNOSIS — Z98.890 STATUS POST LABRAL REPAIR OF SHOULDER: Primary | ICD-10-CM

## 2024-10-09 PROCEDURE — 1036F TOBACCO NON-USER: CPT

## 2024-10-09 PROCEDURE — 99211 OFF/OP EST MAY X REQ PHY/QHP: CPT

## 2024-10-09 PROCEDURE — 99024 POSTOP FOLLOW-UP VISIT: CPT

## 2024-10-09 NOTE — PROGRESS NOTES
Chief Complaint   Patient presents with    Right Shoulder - Post-op     09-24-24         History of Present Illness:  Papa is a 29-year-old male presenting today following right shoulder arthroscopic labral repair, extensive debridement 9/24/2024.  Patient denies any numbness tingling or shortness of breath.  Pain is appropriate for post-op course.  Overall doing well.  Has been compliant with wearing sling at all times.    Physical Examination:  Mild swelling  Incisions healthy, no drainage or erythema  Tolerates gentle passive forward flexion  Neurovascular exam normal distally    Assessment:  29-year-old male status post right shoulder arthroscopic labral repair, extensive debridement, 2 weeks out    Plan:  Surgical details discussed, arthroscopic images reviewed with patient in detail.  Discussed analgesics, encouraging non-opioid pain medications.  Discussed sling wear and activity restrictions.  Patient will continue wearing sling until next follow-up.  Discussed range of motion exercises for elbow and wrist outside of sling at home to combat stiffness.  Discussed physical therapy protocol.  Plan will be to begin physical therapy after next visit.  Follow-up ~ 1 month.     Arthroscopic findings of grade 2 [place] changes of the glenoid, grade 2 [place]bridge change of the humeral head, labrum tear extending from the 4 o'clock position to the 7 o'clock position discussed with patient at length today    Kevin Mace PA-C

## 2024-10-14 ENCOUNTER — APPOINTMENT (OUTPATIENT)
Dept: PHYSICAL THERAPY | Facility: CLINIC | Age: 29
End: 2024-10-14
Payer: COMMERCIAL

## 2024-11-11 ENCOUNTER — OFFICE VISIT (OUTPATIENT)
Dept: ORTHOPEDIC SURGERY | Facility: CLINIC | Age: 29
End: 2024-11-11
Payer: COMMERCIAL

## 2024-11-11 DIAGNOSIS — Z98.890 STATUS POST LABRAL REPAIR OF SHOULDER: Primary | ICD-10-CM

## 2024-11-11 PROCEDURE — 99211 OFF/OP EST MAY X REQ PHY/QHP: CPT | Performed by: STUDENT IN AN ORGANIZED HEALTH CARE EDUCATION/TRAINING PROGRAM

## 2024-11-11 NOTE — PROGRESS NOTES
History of Present Illness:  Patient returns today after shoulder arthroscopy.  The patient denies any significant pain at rest but does have pain with motion and activities.     Physical Examination:  Well healed incisions, no significant swelling   Forward flexion: 0-90  Rotation: 0-30  Neurovascular exam normal distally    Assessment:  Patient status post right shoulder arthroscopy anterior and posterior labral repair     Plan:  Discussed transition out of sling.  Discussed physical therapy protocol and activity restrictions.  Will initiate gentle active and passive range of motion of the shoulder  Discussed importance of using pain as a guide

## 2024-11-11 NOTE — LETTER
November 11, 2024     Patient: Papa Linares   YOB: 1995   Date of Visit: 11/11/2024       To Whom It May Concern:    It is my medical opinion that Papa Linares may return to light duty immediately with the following restrictions: no lifting,pushing or pulling with right arm .    If you have any questions or concerns, please don't hesitate to call.         Sincerely,        Cristobal Ogden MD    CC: No Recipients

## 2024-11-11 NOTE — LETTER
November 11, 2024     Patient: Papa Linares   YOB: 1995   Date of Visit: 11/11/2024       To Whom It May Concern:    It is my medical opinion that Papa Linares may return to light duty immediately with the following restrictions: no lifting, pulling or pushing with right arm until cleared by physician .    If you have any questions or concerns, please don't hesitate to call.         Sincerely,        Cristobal Ogden MD    CC: No Recipients

## 2024-12-02 ENCOUNTER — APPOINTMENT (OUTPATIENT)
Dept: PRIMARY CARE | Facility: CLINIC | Age: 29
End: 2024-12-02
Payer: COMMERCIAL

## 2024-12-03 ENCOUNTER — EVALUATION (OUTPATIENT)
Dept: PHYSICAL THERAPY | Facility: CLINIC | Age: 29
End: 2024-12-03
Payer: COMMERCIAL

## 2024-12-03 DIAGNOSIS — S43.431D LABRAL TEAR OF SHOULDER, RIGHT, SUBSEQUENT ENCOUNTER: Primary | ICD-10-CM

## 2024-12-03 PROCEDURE — 97161 PT EVAL LOW COMPLEX 20 MIN: CPT | Mod: GP

## 2024-12-03 PROCEDURE — 97110 THERAPEUTIC EXERCISES: CPT | Mod: GP

## 2024-12-03 ASSESSMENT — PAIN - FUNCTIONAL ASSESSMENT: PAIN_FUNCTIONAL_ASSESSMENT: 0-10

## 2024-12-03 ASSESSMENT — PAIN SCALES - GENERAL: PAINLEVEL_OUTOF10: 2

## 2024-12-03 NOTE — PROGRESS NOTES
"Physical Therapy Evaluation    Patient Name: Papa Linares  MRN: [id number]  Time Calculation  Start Time: 0247  Stop Time: 0322  Time Calculation (min): 35 min  PT Evaluation Time Entry  PT Evaluation (Low) Time Entry: 15  PT Therapeutic Procedures Time Entry  Therapeutic Exercise Time Entry: 20                   Current Problem  1. Labral tear of shoulder, right, subsequent encounter  Follow Up In Physical Therapy        Insurance    Insurance reviewed   Visit number: 1  [facility] CHOICE PLUS 60V PT OT COPAY 0 DED 1400(700),COVERAGE 100 OOP 5000(4406) [phone](4406) NO AUTH REQ       Subjective   General:  Patient is a 30 y/o M who is here today s/p R shoulder arthroscopic anterior and posterior labral repair on 9/24/24 with Dr. Cristobal Ogden III. Patient also w/ recent h/o 1st rib excision; tenotomy of anterior/middle scalenes and pec minor by Dr. Sofia Layton on 6/19/24. Patient reports no issues from TOS surgery, and reports feeling pretty good after labral repair. Denies any numbness/tingling, no signs/symptoms of infection or DVT. Patient was compliant with sling use and was released from the sling about 3 weeks ago.     PMHx significant of; TOS s/p 1st rib excision 6/19/24    Pt goal for PT: \"be able to use my arm again\"  Precautions:  A/PROM R shoulder  Pain:  Current: 1-2/10 R shoulder, aching/soreness  Worst: 5-6/10, R shoulder, sharp  Best: 0/10  Pain Exacerbating Factors: lifting, carrying, reaching, driving, sleeping, performing work duties, performing ADLs/IADLs  Pain Relieving Factors: ice as needed  Reviewed medical screening form with pt and medical screening assessed    Objective   Shoulder Musculoskeletal Exam    Range of Motion    Right      Active ROM: abnormal and pain.       Passive ROM: abnormal and pain.       Active forward elevation: 110.       Passive forward elevation: 160.       Shoulder active abduction: 90.       Passive abduction: 130.       Active external rotation at side: 40.       Passive " external rotation at side: 80.       Passive internal rotation in abduction: 60.       Internal rotation: S1.     Left      Active ROM: normal and no pain.     Strength    Right      External rotation: 3/5. External rotation is affected by pain.       Internal rotation: 3/5. Internal rotation is affected by pain.       Abduction: 3/5. Abduction is affected by pain.       Biceps: 3/5. Biceps are affected by pain.       Triceps: 3/5. Triceps are affected by pain.     Left      External rotation: 5/5.       Internal rotation: 5/5.       Abduction: 5/5.       Biceps: 5/5.       Triceps: 5/5.        Outcome Measures:  Other Measures  Disability of Arm Shoulder Hand (DASH): 16     Treatment: see HEP below    EDUCATION/HEP:  Access Code: [email or web address]  URL: [email or web address]  Date: 12/03/2024  Prepared by: Harini Pastrana    Exercises  - Supine Shoulder Flexion Extension AAROM with Dowel  - 1 x daily - 7 x weekly - 2 sets - 10 reps  - Supine Shoulder External Rotation with Dowel  - 1 x daily - 7 x weekly - 2 sets - 10 reps  - Standing Shoulder Abduction AAROM with Dowel  - 1 x daily - 7 x weekly - 2 sets - 10 reps  - Standing Bilateral Shoulder Internal Rotation AAROM with Dowel  - 1 x daily - 7 x weekly - 2 sets - 10 reps  - Standing Shoulder Extension with Dowel  - 1 x daily - 7 x weekly - 2 sets - 10 reps    Assessment:  Patient is a 30 y/o M who participated in PT evaluation this date s/p R shoulder arthroscopic anterior and posterior labral repair on 9/24/24 with Dr. Cristobal Ogden III. Patient presents with R shoulder pain, decreased R shoulder A/PROM, decreased RUE strength, and impaired posture. Due to these impairments, pt has increased difficulty with lifting, carrying, reaching, driving, sleeping, performing work duties and performing ADLs/IADLs. Pt would benefit from PT services to decrease pain, increase ROM/tissue mobility, improve strength, and posture to facilitate return to work and  prior level of activities as able.     Problem List: activity limitations, ADLs/IADLs/self care skills, decreased functional level, decreased knowledge of HEP, decreased knowledge of precautions, flexibility, pain, participation restrictions, posture, range of motion/joint mobility and strength.     Clinical Presentation: Stable     Level of Complexity: Low     Goals:  Pt will report at least 75% improvement in R shoulder pain during everyday activities.  Pt will demo >/= 4+/5 strength of R shoulder musculature without pain.  Pt will demo full and symmetrical A/PROM of R shoulder without pain.  Pt will demo independence and report compliance with HEP.     Plan  1-2x/week for 10 visits     Skilled therapeutic intervention to address the above mentioned physical and functional impairments and limitations including, but not limited to: patient education, therapeutic exercise, therapeutic activity, manual therapy, body mechanics training, dry needling, blood flow restriction training, instrumented soft tissue mobilization, manual soft tissue mobilization, gait retraining, biofeedback, cryotherapy, electrical stimulation, home program development, hot pack, taping, neuromuscular re-education, self-care/home management, and vasopneumatic compression.

## 2024-12-09 NOTE — PROGRESS NOTES
Physical Therapy Treatment    Patient Name: Papa Linares  MRN: [id number]  Today's Date: 12/10/2024  Time Calculation  Start Time: 0420  Stop Time: 0501  Time Calculation (min): 41 min     PT Therapeutic Procedures Time Entry  Manual Therapy Time Entry: 10  Therapeutic Exercise Time Entry: 29                 Current Problem  1. Labral tear of shoulder, right, subsequent encounter  Follow Up In Physical Therapy          Insurance:  Visit number: 2  [facility] CHOICE PLUS 60V PT OT COPAY 0 DED 1400(700),COVERAGE 100 OOP [phone]) [phone](4406) NO AUTH REQ       Precautions   A/PROM R shoulder     Subjective   Subjective:   Pt reports that his shoulder has been feeling pretty good. No pain at rest  Pain   0/10    Objective   62* AAROM supine ER  Treatments:  - Supine Shoulder Flexionm /ER w/ wand 20x  - Standing Shoulder Abduction AAROM with Dowel  -10x2  - Standing Bilateral Shoulder Internal Rotation, ext w/ wand 10x2  -s/l shoulder flexion, ER 10x ea  -Supine alphabet 1x  -pulleys flex, abd 20x ea  -Wall slides flex, scaption    PROM shoulder 10'  OP EDUCATION:   Access Code: [email or web address]  URL: [email or web address]  Date: 12/10/2024  Prepared by: Stephani Land    Exercises  - Sidelying Shoulder Flexion 15 Degrees  - 1 x daily - 7 x weekly - 2 sets - 10 reps  - Sidelying Shoulder External Rotation  - 1 x daily - 7 x weekly - 2 sets - 10 reps  - Standing shoulder flexion wall slides  - 1 x daily - 7 x weekly - 2 sets - 10 reps  - Standing Bilateral Shoulder Scaption Wall Slide  - 1 x daily - 7 x weekly - 2 sets - 10 reps      Assessment:   Pt is progressing well with his shoulder ROM, as seen with stretching and wand exercises. Introduced s/l ex which pt able to maintain good form with. Pt displayed some limited motion with wand abd. Added pulleys, which again pt had some abduction tightness. Tried wall slides with good tolerance, just fatigue, especially with scaption    Plan:   Cont to progress shoulder  AROM

## 2024-12-10 ENCOUNTER — TREATMENT (OUTPATIENT)
Dept: PHYSICAL THERAPY | Facility: CLINIC | Age: 29
End: 2024-12-10
Payer: COMMERCIAL

## 2024-12-10 DIAGNOSIS — S43.431D LABRAL TEAR OF SHOULDER, RIGHT, SUBSEQUENT ENCOUNTER: Primary | ICD-10-CM

## 2024-12-10 PROCEDURE — 97110 THERAPEUTIC EXERCISES: CPT | Mod: GP,CQ

## 2024-12-10 PROCEDURE — 97140 MANUAL THERAPY 1/> REGIONS: CPT | Mod: GP,CQ

## 2024-12-18 ENCOUNTER — TREATMENT (OUTPATIENT)
Dept: PHYSICAL THERAPY | Facility: CLINIC | Age: 29
End: 2024-12-18
Payer: COMMERCIAL

## 2024-12-18 DIAGNOSIS — S43.431D LABRAL TEAR OF SHOULDER, RIGHT, SUBSEQUENT ENCOUNTER: Primary | ICD-10-CM

## 2024-12-18 PROCEDURE — 97110 THERAPEUTIC EXERCISES: CPT | Mod: GP,CQ

## 2024-12-18 PROCEDURE — 97140 MANUAL THERAPY 1/> REGIONS: CPT | Mod: GP,CQ

## 2024-12-18 ASSESSMENT — PAIN SCALES - GENERAL: PAINLEVEL_OUTOF10: 0 - NO PAIN

## 2024-12-18 ASSESSMENT — PAIN - FUNCTIONAL ASSESSMENT: PAIN_FUNCTIONAL_ASSESSMENT: 0-10

## 2024-12-18 NOTE — PROGRESS NOTES
Physical Therapy Treatment    Patient Name: Papa Linares  MRN: [id number]  Today's Date: 12/18/2024  Time Calculation  Start Time: 0245  Stop Time: 0330  Time Calculation (min): 45 min     PT Therapeutic Procedures Time Entry  Manual Therapy Time Entry: 10  Therapeutic Exercise Time Entry: 35               30 y/o M who participated in PT evaluation this date s/p R shoulder arthroscopic anterior and posterior labral repair on 9/24/24 with Dr. Cristobal Ogden III.   Insurance:   Visit number: 3  [facility] CHOICE PLUS 60V PT OT COPAY 0 DED 1400(700),COVERAGE 100 OOP [phone]) [phone](4406) NO AUTH REQ   Assessment:   Progressed his program today w/ addition of Supine wand serratus punches. Pt presents w/ good ability to complete ex w/o c/o pain or other symptoms. Improved ROM w/ ex's performed.  Encouraged him to not over do it and avoid lifting at this time to comply w/ MD restriction/protocol. He will cont to benefit from skilled PT to address his deficits and improve his overall functional ability.   Plan:   Cont to progress shoulder AROM.     Current Problem  1. Labral tear of shoulder, right, subsequent encounter  Follow Up In Physical Therapy          Subjective   General  Reason for Referral: R shoulder labral repair  Referred By: Dr. Cristobal Ogden IIIPt reports compliance w/HEP. No current pain reported.   Precautions   A/PROM R shoulder     Pain  Pain Assessment: 0-10  0-10 (Numeric) Pain Score: 0 - No pain    Objective       Treatments:  Therapeutic Exercise (94992):   - Supine Shoulder Flexion /ER w/ wand 20x  - Standing Shoulder Abduction AAROM with Dowel  -x20   - Standing Bilateral Shoulder Internal Rotation, ext w/ wand x20 ea  -s/l shoulder flexion, ER 15x ea  -Supine alphabet 1x  Supine Serratus Punches x 20   -pulleys flex, abd 20x ea  -Wall slides flex, scaption     Manual (94106):  PROM R shoulder 10'   EDUCATION:

## 2024-12-23 ENCOUNTER — TREATMENT (OUTPATIENT)
Dept: PHYSICAL THERAPY | Facility: CLINIC | Age: 29
End: 2024-12-23
Payer: COMMERCIAL

## 2024-12-23 DIAGNOSIS — S43.431D LABRAL TEAR OF SHOULDER, RIGHT, SUBSEQUENT ENCOUNTER: ICD-10-CM

## 2024-12-23 PROCEDURE — 97110 THERAPEUTIC EXERCISES: CPT | Mod: GP

## 2024-12-23 NOTE — PROGRESS NOTES
Physical Therapy Treatment    Patient Name: Papa Linares  MRN: [id number]  Time Calculation  Start Time: 0815  Stop Time: 0854  Time Calculation (min): 39 min     PT Therapeutic Procedures Time Entry  Therapeutic Exercise Time Entry: 39                   Current Problem  1. Labral tear of shoulder, right, subsequent encounter  Follow Up In Physical Therapy      Insurance:   Visit number: 4  [facility] CHOICE PLUS 60V PT OT COPAY 0 DED 1400(700),COVERAGE 100 OOP [phone]) [phone](4406) NO AUTH REQ     Subjective   General  Pt reports feeling pretty good today, no c/o pain noted coming in. n  Precautions   A/PROM R shoulder   Pain  0/10    Objective     Treatments:  UBE F/R: 2'/2'  Wall slides flex/scap: x20 each  Wall alphabet: x1  Supine wand flex/ER: x20 each  Sidelying 4-way shoulder: x20 each  Standing wand abd/IR/ext: x20 each  Standing IYT: x20 each  Bent h.abd/rows/ext: x20 each    OP EDUCATION/HEP:    Assessment   Pt continues to demo improving R shoulder ROM this date in all planes. Continued to add further AROM exercises to gradually improve R shoulder strength and stability. Pt has no c/o pain w/ all exercises, only muscle fatigue as expected. Pt would continue to benefit from PT services to continue to decrease pain, increase ROM/tissue mobility, improve strength, and posture to facilitate return to prior level of activities as able.     Plan   Continue to progress per protocol and pt tolerance

## 2024-12-30 ENCOUNTER — TREATMENT (OUTPATIENT)
Dept: PHYSICAL THERAPY | Facility: CLINIC | Age: 29
End: 2024-12-30
Payer: COMMERCIAL

## 2024-12-30 DIAGNOSIS — S43.431D LABRAL TEAR OF SHOULDER, RIGHT, SUBSEQUENT ENCOUNTER: ICD-10-CM

## 2024-12-30 PROCEDURE — 97140 MANUAL THERAPY 1/> REGIONS: CPT | Mod: GP,CQ

## 2024-12-30 PROCEDURE — 97110 THERAPEUTIC EXERCISES: CPT | Mod: GP,CQ

## 2024-12-30 ASSESSMENT — PAIN SCALES - GENERAL: PAINLEVEL_OUTOF10: 0 - NO PAIN

## 2024-12-30 ASSESSMENT — PAIN - FUNCTIONAL ASSESSMENT: PAIN_FUNCTIONAL_ASSESSMENT: 0-10

## 2024-12-30 NOTE — PROGRESS NOTES
Physical Therapy Treatment    Patient Name: Papa Linares  MRN: [id number]  Today's Date: 12/30/2024  Time Calculation  Start Time: 1000  Stop Time: 1042  Time Calculation (min): 42 min  PT Therapeutic Procedures Time Entry  Manual Therapy Time Entry: 8  Therapeutic Exercise Time Entry: 30       Current Problem  1. Labral tear of shoulder, right, subsequent encounter  Follow Up In Physical Therapy          Subjective   General   Pt stating no issues, but some weakness ranging abd.   Insurance   Visit number: 5  [facility] CHOICE PLUS 60V PT OT COPAY 0 DED 1400(700),COVERAGE 100 OOP [phone]) [phone](4406) NO AUTH REQ   Precautions  Precautions  Precautions Comment: Okay begin light resistance per PA 12/30/24  Pain  Pain Assessment: 0-10  0-10 (Numeric) Pain Score: 0 - No pain    Objective   Treatments:  UBE f/r 3'/3'  Wall slides flex/abd 3x10  Wall ABC's x1  Side lying ER,abd 2# 2x10  Side lying flex, h.abd 1# 2x10  Prone I/Y/T's 1# 2x10  Jobes' 1# 2x10    Manual  PROM    Assessment   Discussed introductino light resistance with PA. Able to progress all ex's with introduction light weight with normal muscular fatigue. Denies pain throughout treatment. Educated introduction 1-2# at home, no more until cleared by MD and using pain as guide. Reviewed HEP. Some tightness end range OH and into IR, but with full motion noted by end of MTT.   Plan:  Cont to progress strength and endurance     OP EDUCATION:       Goals:

## 2025-01-06 NOTE — PROGRESS NOTES
Physical Therapy Treatment    Patient Name: Papa Linares  MRN: [id number]  Today's Date: 1/7/2025  Time Calculation  Start Time: 0250  Stop Time: 0333  Time Calculation (min): 43 min     PT Therapeutic Procedures Time Entry  Manual Therapy Time Entry: 8  Therapeutic Exercise Time Entry: 33                 Current Problem  1. Labral tear of shoulder, right, subsequent encounter  Follow Up In Physical Therapy          Insurance:  Visit number: 6  [facility] CHOICE PLUS 60V PT OT COPAY 0 DED 1400(700),COVERAGE 100 OOP 5000(4406) [phone](4406) NO AUTH REQ  Precautions   Okay begin light resistance per PA 12/30/24     Subjective   Subjective:   Pt reports that his shoulder is feeling good, he is gaining more mobility. He has noticed a little tingling at the tip of the index and middle finger.  Pain   0/10    Objective   Treatments:  UBE f/r 3'/3'   Supine SA punch with RTB 2x10  Wall slides flex/abd 3x10---  Wall ABC's x1---  Side lying ER,abd 2# 2x10  Side lying flex, h.abd 1# 2x10  Prone I/Y/T's 1# 2x10----  Jobes' 1# 2x10  Shoulder rows Rtb 20x  ER/IR iso walkouts RTB 10x ea  Ball on wall up/down, s/s, cw/ccw  SA wall slides 20x     Manual  PROM 8'  OP EDUCATION:   Access Code: [email or web address]  URL: [email or web address]  Date: 01/07/2025  Prepared by: Stephani Badillo    Exercises  - Standing Bilateral Low Shoulder Row with Anchored Resistance  - 1 x daily - 7 x weekly - 3 sets - 10 reps  - Shoulder External Rotation Reactive Isometrics  - 1 x daily - 7 x weekly - 2 sets - 10 reps  - Shoulder Internal Rotation Reactive Isometrics  - 1 x daily - 7 x weekly - 2 sets - 10 reps      Assessment:   Pt appears to be progressing well with gentle shoulder strengthening. He is able to maintain proper technique with s/l 4 way. Didn't display shoulder hiking with jobes. Used TB with SA punches with good follow through. Reminders with rows to not hyperextend the shoulders. Good tolerance to therapy session.    Plan:   Cont to  progress with gentle shoulder strengthening.

## 2025-01-07 ENCOUNTER — TREATMENT (OUTPATIENT)
Dept: PHYSICAL THERAPY | Facility: CLINIC | Age: 30
End: 2025-01-07
Payer: COMMERCIAL

## 2025-01-07 DIAGNOSIS — S43.431D LABRAL TEAR OF SHOULDER, RIGHT, SUBSEQUENT ENCOUNTER: Primary | ICD-10-CM

## 2025-01-07 PROCEDURE — 97110 THERAPEUTIC EXERCISES: CPT | Mod: GP,CQ

## 2025-01-07 PROCEDURE — 97140 MANUAL THERAPY 1/> REGIONS: CPT | Mod: GP,CQ

## 2025-01-08 SDOH — HEALTH STABILITY: PHYSICAL HEALTH: ON AVERAGE, HOW MANY DAYS PER WEEK DO YOU ENGAGE IN MODERATE TO STRENUOUS EXERCISE (LIKE A BRISK WALK)?: 7 DAYS

## 2025-01-08 SDOH — HEALTH STABILITY: PHYSICAL HEALTH: ON AVERAGE, HOW MANY MINUTES DO YOU ENGAGE IN EXERCISE AT THIS LEVEL?: 60 MIN

## 2025-01-09 ENCOUNTER — OFFICE VISIT (OUTPATIENT)
Age: 30
End: 2025-01-09
Payer: COMMERCIAL

## 2025-01-09 VITALS
WEIGHT: 159 LBS | HEART RATE: 95 BPM | HEIGHT: 65 IN | TEMPERATURE: 98.5 F | SYSTOLIC BLOOD PRESSURE: 118 MMHG | OXYGEN SATURATION: 100 % | DIASTOLIC BLOOD PRESSURE: 64 MMHG | BODY MASS INDEX: 26.49 KG/M2

## 2025-01-09 DIAGNOSIS — F40.243 ANXIETY WITH FLYING: ICD-10-CM

## 2025-01-09 DIAGNOSIS — T75.3XXA MOTION SICKNESS, INITIAL ENCOUNTER: Primary | ICD-10-CM

## 2025-01-09 PROCEDURE — G8419 CALC BMI OUT NRM PARAM NOF/U: HCPCS | Performed by: NURSE PRACTITIONER

## 2025-01-09 PROCEDURE — G8427 DOCREV CUR MEDS BY ELIG CLIN: HCPCS | Performed by: NURSE PRACTITIONER

## 2025-01-09 PROCEDURE — 1036F TOBACCO NON-USER: CPT | Performed by: NURSE PRACTITIONER

## 2025-01-09 PROCEDURE — 99204 OFFICE O/P NEW MOD 45 MIN: CPT | Performed by: NURSE PRACTITIONER

## 2025-01-09 RX ORDER — SCOPOLAMINE 1 MG/3D
1 PATCH, EXTENDED RELEASE TRANSDERMAL
Qty: 10 PATCH | Refills: 1 | Status: SHIPPED | OUTPATIENT
Start: 2025-01-09

## 2025-01-09 RX ORDER — ALPRAZOLAM 0.5 MG
0.5 TABLET ORAL DAILY PRN
Qty: 6 TABLET | Refills: 0 | Status: SHIPPED | OUTPATIENT
Start: 2025-01-09 | End: 2025-01-15

## 2025-01-09 SDOH — ECONOMIC STABILITY: FOOD INSECURITY: WITHIN THE PAST 12 MONTHS, YOU WORRIED THAT YOUR FOOD WOULD RUN OUT BEFORE YOU GOT MONEY TO BUY MORE.: NEVER TRUE

## 2025-01-09 SDOH — ECONOMIC STABILITY: FOOD INSECURITY: WITHIN THE PAST 12 MONTHS, THE FOOD YOU BOUGHT JUST DIDN'T LAST AND YOU DIDN'T HAVE MONEY TO GET MORE.: NEVER TRUE

## 2025-01-09 ASSESSMENT — PATIENT HEALTH QUESTIONNAIRE - PHQ9
SUM OF ALL RESPONSES TO PHQ QUESTIONS 1-9: 0
2. FEELING DOWN, DEPRESSED OR HOPELESS: NOT AT ALL
1. LITTLE INTEREST OR PLEASURE IN DOING THINGS: NOT AT ALL
SUM OF ALL RESPONSES TO PHQ9 QUESTIONS 1 & 2: 0
SUM OF ALL RESPONSES TO PHQ QUESTIONS 1-9: 0

## 2025-01-09 NOTE — PROGRESS NOTES
[facility]  [facility]  [address] [address]  Dept: [phone]  Dept Fax: [phone]  Loc: [phone]     Subjective  Harish Emery, 29 y.o. male New patient presents today with:  Chief Complaint   Patient presents with    New Patient     Pt has no concerns        History of Present Illness  The patient presents for establishment of care.    He has been without a primary care provider for approximately 6 to 7 years, with his last provider being a pediatrician. He has not undergone any recent blood work, except for those conducted in preparation for his shoulder surgery and thoracic outlet surgery in June. He is uncertain if a cholesterol panel was included in these tests. He does not receive influenza vaccinations and does not consult with any specialists other than orthopedics. He has no other known diagnoses, although he had asthma at the age of 5, which resolved without medication. He is currently under the care of a physical therapist, Franklin Kumar Junior the third.    He experiences anxiety during air travel, particularly during takeoff and landing, and also reports discomfort on roller coasters. He has never been prescribed daily medication for anxiety and has not taken any specific medication for it during past flights. His last flight was two years ago to [place], during which he managed his anxiety by closing his eyes during takeoff. He was provided with a patch for motion sickness prior to his surgery. He reports mild anxiety in general but does not consider it debilitating. He reports no recreational drug use.    SOCIAL HISTORY  He reports no recreational drug use.    IMMUNIZATIONS  He does not receive influenza vaccinations.      Past Medical History:   Diagnosis Date    Allergic rhinitis     RAD (reactive airway disease)      No past surgical history on file.  Immunization

## 2025-01-13 ENCOUNTER — OFFICE VISIT (OUTPATIENT)
Dept: ORTHOPEDIC SURGERY | Facility: CLINIC | Age: 30
End: 2025-01-13
Payer: COMMERCIAL

## 2025-01-13 VITALS — HEIGHT: 65 IN | WEIGHT: 168 LBS | BODY MASS INDEX: 27.99 KG/M2

## 2025-01-13 DIAGNOSIS — Z98.890 STATUS POST LABRAL REPAIR OF SHOULDER: Primary | ICD-10-CM

## 2025-01-13 PROCEDURE — 99213 OFFICE O/P EST LOW 20 MIN: CPT | Performed by: STUDENT IN AN ORGANIZED HEALTH CARE EDUCATION/TRAINING PROGRAM

## 2025-01-13 PROCEDURE — 3008F BODY MASS INDEX DOCD: CPT | Performed by: STUDENT IN AN ORGANIZED HEALTH CARE EDUCATION/TRAINING PROGRAM

## 2025-01-13 PROCEDURE — 1036F TOBACCO NON-USER: CPT | Performed by: STUDENT IN AN ORGANIZED HEALTH CARE EDUCATION/TRAINING PROGRAM

## 2025-01-13 NOTE — PROGRESS NOTES
History of Present Illness:  Patient returns today after shoulder arthroscopy doing well.  The patient endorses good relief of pre-operative symptoms and increasing activity level.     Patient states that his shoulder has not felt this good in 15 years, very happy with the way he is currently feeling.    Physical Examination:  Well healed incisions  Full forward flexion, rotation: No appreciable deficits with testing  No significant deficits in rotator cuff strength testing  Neurovascular exam normal distally    Assessment:  Patient status post right shoulder arthroscopy anterior labral repair    Plan:  Discussed home exercise program and activities to avoid.  Discussed return to activities.  Reviewed NSAIDS for occasional pain, discussed the risks and benefits.  We reviewed the risk of reinjury / retear.  Follow-up: As needed        Very happy with overall results.  He will follow-up as needed.

## 2025-01-14 ENCOUNTER — TREATMENT (OUTPATIENT)
Dept: PHYSICAL THERAPY | Facility: CLINIC | Age: 30
End: 2025-01-14
Payer: COMMERCIAL

## 2025-01-14 DIAGNOSIS — S43.431D LABRAL TEAR OF SHOULDER, RIGHT, SUBSEQUENT ENCOUNTER: ICD-10-CM

## 2025-01-14 PROCEDURE — 97110 THERAPEUTIC EXERCISES: CPT | Mod: GP,CQ

## 2025-01-14 NOTE — PROGRESS NOTES
Physical Therapy Treatment    Patient Name: Papa Linares  MRN: [id number]  Today's Date: 1/14/2025  Time Calculation  Start Time: 1445  Stop Time: 1525  Time Calculation (min): 40 min  PT Therapeutic Procedures Time Entry  Therapeutic Exercise Time Entry: 38       Insurance:  Visit number: 7/10  [facility] CHOICE PLUS 60V PT OT COPAY 0 DED 1400(700),COVERAGE 100 OOP 5000(4406) [phone](4406) NO AUTH REQ    Current Problem  1. Labral tear of shoulder, right, subsequent encounter  Follow Up In Physical Therapy          Subjective   General   Pt. Reports his shoulder is feeling great.   Precautions     Pain       Objective   Treatments:   UBE f/r 3'/3'   Side lying ER,abd 2# 2x10  Side lying flex, h.abd 2# 2x10  Prone I/Y/T's 1# 2x10  Jobes 1# 2x10  Shoulder rows Rtb 20x  ER/IR iso walkouts RTB 10x ea  TB SA punch with RTB 2x10  TB PNF D1/D2 RTB x20  Ball on wall up/down, s/s, cw/ccw --  SA wall slides --    Assessment:   Pt. Progressing w/ all exercises well. Added weight to S/L 4-way for increasing strength. Added TB PNF for increasing strength w/ good pt. Tolerance. Pt. Seemed to have the most fatigue w/ prone I/T/Y which is to be expected at this point. Pt. Will continue w/ current HEP.     Plan:   Continue to increase strength/Endurance.     OP EDUCATION:       Goals:

## 2025-01-21 ENCOUNTER — APPOINTMENT (OUTPATIENT)
Dept: PHYSICAL THERAPY | Facility: CLINIC | Age: 30
End: 2025-01-21
Payer: COMMERCIAL

## 2025-01-28 ENCOUNTER — TREATMENT (OUTPATIENT)
Dept: PHYSICAL THERAPY | Facility: CLINIC | Age: 30
End: 2025-01-28
Payer: COMMERCIAL

## 2025-01-28 DIAGNOSIS — S43.431D LABRAL TEAR OF SHOULDER, RIGHT, SUBSEQUENT ENCOUNTER: ICD-10-CM

## 2025-01-28 PROCEDURE — 97530 THERAPEUTIC ACTIVITIES: CPT | Mod: GP

## 2025-01-28 NOTE — PROGRESS NOTES
Physical Therapy Recheck/Treatment    Patient Name: Papa Linares  MRN: [id number]  Time Calculation  Start Time: 0244  Stop Time: 0309  Time Calculation (min): 25 min     PT Therapeutic Procedures Time Entry  Therapeutic Activity Time Entry: 25                   Current Problem  1. Labral tear of shoulder, right, subsequent encounter  Follow Up In Physical Therapy      Insurance:  Visit number: 8/10  [facility] CHOICE PLUS 60V PT OT COPAY 0 DED 1400(700),COVERAGE 100 OOP 5000(4406) [id number](4406) NO AUTH REQ    Subjective   General  Pt reports that his shoulder has been feeling great. He has gotten back into working out and has only had muscle soreness, no pain.  Precautions  None   Pain  0/10    Objective   Shoulder Musculoskeletal Exam    Range of Motion    Right      Active ROM: normal and no pain.       Active forward elevation: 180.       Shoulder active abduction: 180.       Active external rotation at side: 90.       Internal rotation: T8.     Left      Left shoulder range of motion is normal.     Strength    Right      External rotation: 5/5.       Internal rotation: 5/5.       Abduction: 5/5.       Biceps: 5/5.       Triceps: 5/5.     Left      External rotation: 5/5.       Internal rotation: 5/5.       Abduction: 5/5.       Biceps: 5/5.       Triceps: 5/5.      Treatments:  Includes measurements for recheck  UBE F/R: 3'/3' hills L3    Outcome Measures:  Other Measures  Disability of Arm Shoulder Hand (DASH): 0    OP EDUCATION/HEP:      Assessment   Patient is a 30 y/o M who has completed 8 PT visits s/p R shoulder arthroscopic anterior and posterior labral repair on 9/24/24 with Dr. Cristobal Ogden III. Patient has made significant progress towards all functional goals since starting PT, and feels he is 100% improved since starting PT. He has no c/o pain, demos full R shoulder AROM, and WNL RUE strength. He has returned to his normal level of prior activities and working out, and is not having any difficulty. He  feels confident to continue to increase his activity on his on own at home and will follow-up as needed.     Goals:  Pt will report at least 75% improvement in R shoulder pain during everyday activities. GOAL MET  Pt will demo >/= 4+/5 strength of R shoulder musculature without pain. GOAL MET  Pt will demo full and symmetrical A/PROM of R shoulder without pain. GOAL MET  Pt will demo independence and report compliance with HEP. GOAL MET    Plan   Discharge patient, all goals met

## 2025-02-04 ENCOUNTER — APPOINTMENT (OUTPATIENT)
Dept: PHYSICAL THERAPY | Facility: CLINIC | Age: 30
End: 2025-02-04
Payer: COMMERCIAL

## 2025-02-11 ENCOUNTER — APPOINTMENT (OUTPATIENT)
Dept: PHYSICAL THERAPY | Facility: CLINIC | Age: 30
End: 2025-02-11
Payer: COMMERCIAL

## (undated) DEVICE — SUTURE, VICRYL, 3-0, 27 IN, SH

## (undated) DEVICE — Device

## (undated) DEVICE — SUTURE, PROLENE, 6-0, 24 IN, BV1, DA, BLUE

## (undated) DEVICE — DRAPE, INCISE, ANTIMICROBIAL, IOBAN 2, LARGE, 17 X 23 IN, DISPOSABLE, STERILE

## (undated) DEVICE — LOOP, VESSEL, MINI, BLUE STERILE

## (undated) DEVICE — LIGASURE, SEALER/DIVIDER MARYLAND JAW, 5MM

## (undated) DEVICE — ADHESIVE, SKIN, LIQUIBAND EXCEED

## (undated) DEVICE — DRAPE, ORTHOMAX UP, 108 X 90IN, STERILE

## (undated) DEVICE — SUTURE, SILK, 2-0, 30 IN, SH, BLACK

## (undated) DEVICE — CONTAINER, SPECIMEN, 120 ML, STERILE

## (undated) DEVICE — LIGASURE EXACT DISSECTOR

## (undated) DEVICE — CORD, ELECTROSURGICAL, BIPOLAR

## (undated) DEVICE — LOOP, VESSEL, MINI, WHITE

## (undated) DEVICE — DRAIN, WOUND, FLAT, HUBLESS, FULL LENGTH PERFORATION, 10 MM X 20 CM, SILICONE

## (undated) DEVICE — COVER, TABLE, 44 X 75 IN, DISPOSABLE, LF, STERILE

## (undated) DEVICE — ADHESIVE, SKIN, DERMABOND ADVANCED, 15CM, PEN-STYLE

## (undated) DEVICE — SUTURE, MONOCRYL, 4-0, 18 IN, PS2, UNDYED

## (undated) DEVICE — STOCKINETTE, DOUBLE PLY, 4 X 48 IN, STERILE

## (undated) DEVICE — PROTECTOR, NERVE, ULNAR, PINK

## (undated) DEVICE — DRESSING, ADHESIVE, ISLAND, TELFA, 2 X 3.75 IN, LF

## (undated) DEVICE — SUTURE, PROLENE, 3-0, 18 IN, PS2, BLUE

## (undated) DEVICE — COVER, CART, 45 X 27 X 48 IN, CLEAR

## (undated) DEVICE — APPLIER, MULTIPLE CLIP, LIGACLIP, W/20 MEDIUM, 11.5 APPLIER

## (undated) DEVICE — SUTURE, VICRYL, 2-0, 27 IN, BR/SH 27, VIOLET

## (undated) DEVICE — SUTURE, PROLENE, 5-0, 24 IN, C1, DA, BLUE

## (undated) DEVICE — SUTURE, SILK, 3-0, 30 IN, MULTIPACK, BLACK

## (undated) DEVICE — GEL, ULTRASOUND, AQUASONIC 100, 20 GM, STERILE

## (undated) DEVICE — DRAPE, SHEET, FAN FOLDED, HALF, 44 X 58 IN, DISPOSABLE, LF, STERILE

## (undated) DEVICE — DRAPE, PAD, INSTRUMENT, MAGNETIC, MEDIUM, 10 X 16 IN, DISPOSABLE